# Patient Record
Sex: FEMALE | URBAN - METROPOLITAN AREA
[De-identification: names, ages, dates, MRNs, and addresses within clinical notes are randomized per-mention and may not be internally consistent; named-entity substitution may affect disease eponyms.]

---

## 2017-06-16 ENCOUNTER — INPATIENT (INPATIENT)
Facility: HOSPITAL | Age: 32
LOS: 2 days | Discharge: HOME | DRG: 948 | End: 2017-06-19
Attending: INTERNAL MEDICINE | Admitting: INTERNAL MEDICINE
Payer: MEDICAID

## 2017-06-16 VITALS
WEIGHT: 151.9 LBS | SYSTOLIC BLOOD PRESSURE: 132 MMHG | TEMPERATURE: 99 F | DIASTOLIC BLOOD PRESSURE: 91 MMHG | OXYGEN SATURATION: 99 % | HEART RATE: 84 BPM | HEIGHT: 64 IN | RESPIRATION RATE: 18 BRPM

## 2017-06-16 DIAGNOSIS — I82.90 ACUTE EMBOLISM AND THROMBOSIS OF UNSPECIFIED VEIN: ICD-10-CM

## 2017-06-16 DIAGNOSIS — R52 PAIN, UNSPECIFIED: ICD-10-CM

## 2017-06-16 DIAGNOSIS — G08 INTRACRANIAL AND INTRASPINAL PHLEBITIS AND THROMBOPHLEBITIS: ICD-10-CM

## 2017-06-16 DIAGNOSIS — Z98.891 HISTORY OF UTERINE SCAR FROM PREVIOUS SURGERY: Chronic | ICD-10-CM

## 2017-06-16 DIAGNOSIS — Z98.51 TUBAL LIGATION STATUS: Chronic | ICD-10-CM

## 2017-06-16 DIAGNOSIS — Z29.9 ENCOUNTER FOR PROPHYLACTIC MEASURES, UNSPECIFIED: ICD-10-CM

## 2017-06-16 DIAGNOSIS — R29.90 UNSPECIFIED SYMPTOMS AND SIGNS INVOLVING THE NERVOUS SYSTEM: ICD-10-CM

## 2017-06-16 DIAGNOSIS — I10 ESSENTIAL (PRIMARY) HYPERTENSION: ICD-10-CM

## 2017-06-16 PROBLEM — Z00.00 ENCOUNTER FOR PREVENTIVE HEALTH EXAMINATION: Status: ACTIVE | Noted: 2017-06-16

## 2017-06-16 LAB
ALBUMIN SERPL ELPH-MCNC: 3.6 G/DL — SIGNIFICANT CHANGE UP (ref 3.5–5)
ALP SERPL-CCNC: 35 U/L — LOW (ref 40–120)
ALT FLD-CCNC: 29 U/L DA — SIGNIFICANT CHANGE UP (ref 10–60)
ANION GAP SERPL CALC-SCNC: 6 MMOL/L — SIGNIFICANT CHANGE UP (ref 5–17)
APPEARANCE UR: ABNORMAL
AST SERPL-CCNC: 14 U/L — SIGNIFICANT CHANGE UP (ref 10–40)
BASOPHILS # BLD AUTO: 0.1 K/UL — SIGNIFICANT CHANGE UP (ref 0–0.2)
BASOPHILS NFR BLD AUTO: 1 % — SIGNIFICANT CHANGE UP (ref 0–2)
BILIRUB SERPL-MCNC: 0.4 MG/DL — SIGNIFICANT CHANGE UP (ref 0.2–1.2)
BILIRUB UR-MCNC: NEGATIVE — SIGNIFICANT CHANGE UP
BUN SERPL-MCNC: 20 MG/DL — HIGH (ref 7–18)
CA-I BLD-SCNC: 1.29 MMOL/L — SIGNIFICANT CHANGE UP (ref 1.12–1.3)
CALCIUM SERPL-MCNC: 8.7 MG/DL — SIGNIFICANT CHANGE UP (ref 8.4–10.5)
CHLORIDE SERPL-SCNC: 109 MMOL/L — HIGH (ref 96–108)
CO2 SERPL-SCNC: 27 MMOL/L — SIGNIFICANT CHANGE UP (ref 22–31)
COLOR SPEC: ABNORMAL
CREAT SERPL-MCNC: 0.65 MG/DL — SIGNIFICANT CHANGE UP (ref 0.5–1.3)
CRP SERPL-MCNC: <0.2 MG/DL — SIGNIFICANT CHANGE UP (ref 0–0.4)
DIFF PNL FLD: ABNORMAL
EOSINOPHIL # BLD AUTO: 0.1 K/UL — SIGNIFICANT CHANGE UP (ref 0–0.5)
EOSINOPHIL NFR BLD AUTO: 1.6 % — SIGNIFICANT CHANGE UP (ref 0–6)
ERYTHROCYTE [SEDIMENTATION RATE] IN BLOOD: 4 MM/HR — SIGNIFICANT CHANGE UP (ref 0–15)
GLUCOSE SERPL-MCNC: 103 MG/DL — HIGH (ref 70–99)
GLUCOSE UR QL: NEGATIVE — SIGNIFICANT CHANGE UP
HCG SERPL-ACNC: <1 MIU/ML — SIGNIFICANT CHANGE UP
HCT VFR BLD CALC: 42.1 % — SIGNIFICANT CHANGE UP (ref 34.5–45)
HGB BLD-MCNC: 14.1 G/DL — SIGNIFICANT CHANGE UP (ref 11.5–15.5)
KETONES UR-MCNC: NEGATIVE — SIGNIFICANT CHANGE UP
LACTATE SERPL-SCNC: 0.9 MMOL/L — SIGNIFICANT CHANGE UP (ref 0.7–2)
LEUKOCYTE ESTERASE UR-ACNC: ABNORMAL
LYMPHOCYTES # BLD AUTO: 1.7 K/UL — SIGNIFICANT CHANGE UP (ref 1–3.3)
LYMPHOCYTES # BLD AUTO: 22.4 % — SIGNIFICANT CHANGE UP (ref 13–44)
MCHC RBC-ENTMCNC: 30.8 PG — SIGNIFICANT CHANGE UP (ref 27–34)
MCHC RBC-ENTMCNC: 33.6 GM/DL — SIGNIFICANT CHANGE UP (ref 32–36)
MCV RBC AUTO: 91.6 FL — SIGNIFICANT CHANGE UP (ref 80–100)
MONOCYTES # BLD AUTO: 0.4 K/UL — SIGNIFICANT CHANGE UP (ref 0–0.9)
MONOCYTES NFR BLD AUTO: 5.7 % — SIGNIFICANT CHANGE UP (ref 2–14)
NEUTROPHILS # BLD AUTO: 5.4 K/UL — SIGNIFICANT CHANGE UP (ref 1.8–7.4)
NEUTROPHILS NFR BLD AUTO: 69.4 % — SIGNIFICANT CHANGE UP (ref 43–77)
NITRITE UR-MCNC: NEGATIVE — SIGNIFICANT CHANGE UP
PH UR: 5 — SIGNIFICANT CHANGE UP (ref 5–8)
PLATELET # BLD AUTO: 262 K/UL — SIGNIFICANT CHANGE UP (ref 150–400)
POTASSIUM SERPL-MCNC: 4.2 MMOL/L — SIGNIFICANT CHANGE UP (ref 3.5–5.3)
POTASSIUM SERPL-SCNC: 4.2 MMOL/L — SIGNIFICANT CHANGE UP (ref 3.5–5.3)
PROT SERPL-MCNC: 7 G/DL — SIGNIFICANT CHANGE UP (ref 6–8.3)
PROT UR-MCNC: 30 MG/DL
RBC # BLD: 4.6 M/UL — SIGNIFICANT CHANGE UP (ref 3.8–5.2)
RBC # FLD: 12.6 % — SIGNIFICANT CHANGE UP (ref 10.3–14.5)
SODIUM SERPL-SCNC: 142 MMOL/L — SIGNIFICANT CHANGE UP (ref 135–145)
SP GR SPEC: 1.02 — SIGNIFICANT CHANGE UP (ref 1.01–1.02)
TSH SERPL-MCNC: 3.33 UU/ML — SIGNIFICANT CHANGE UP (ref 0.34–4.82)
UROBILINOGEN FLD QL: NEGATIVE — SIGNIFICANT CHANGE UP
WBC # BLD: 7.8 K/UL — SIGNIFICANT CHANGE UP (ref 3.8–10.5)
WBC # FLD AUTO: 7.8 K/UL — SIGNIFICANT CHANGE UP (ref 3.8–10.5)

## 2017-06-16 PROCEDURE — 70552 MRI BRAIN STEM W/DYE: CPT | Mod: 26

## 2017-06-16 PROCEDURE — 99285 EMERGENCY DEPT VISIT HI MDM: CPT | Mod: 25

## 2017-06-16 RX ORDER — CEFTRIAXONE 500 MG/1
2 INJECTION, POWDER, FOR SOLUTION INTRAMUSCULAR; INTRAVENOUS ONCE
Refills: 0 | Status: COMPLETED | OUTPATIENT
Start: 2017-06-16 | End: 2017-06-16

## 2017-06-16 RX ORDER — DEXTROSE 50 % IN WATER 50 %
1 SYRINGE (ML) INTRAVENOUS ONCE
Refills: 0 | Status: DISCONTINUED | OUTPATIENT
Start: 2017-06-16 | End: 2017-06-19

## 2017-06-16 RX ORDER — MORPHINE SULFATE 50 MG/1
2 CAPSULE, EXTENDED RELEASE ORAL ONCE
Refills: 0 | Status: DISCONTINUED | OUTPATIENT
Start: 2017-06-16 | End: 2017-06-16

## 2017-06-16 RX ORDER — MORPHINE SULFATE 50 MG/1
2 CAPSULE, EXTENDED RELEASE ORAL EVERY 6 HOURS
Refills: 0 | Status: DISCONTINUED | OUTPATIENT
Start: 2017-06-16 | End: 2017-06-19

## 2017-06-16 RX ORDER — INSULIN LISPRO 100/ML
VIAL (ML) SUBCUTANEOUS
Refills: 0 | Status: DISCONTINUED | OUTPATIENT
Start: 2017-06-16 | End: 2017-06-19

## 2017-06-16 RX ORDER — PANTOPRAZOLE SODIUM 20 MG/1
40 TABLET, DELAYED RELEASE ORAL DAILY
Refills: 0 | Status: DISCONTINUED | OUTPATIENT
Start: 2017-06-16 | End: 2017-06-19

## 2017-06-16 RX ORDER — SODIUM CHLORIDE 9 MG/ML
1000 INJECTION INTRAMUSCULAR; INTRAVENOUS; SUBCUTANEOUS ONCE
Refills: 0 | Status: COMPLETED | OUTPATIENT
Start: 2017-06-16 | End: 2017-06-16

## 2017-06-16 RX ORDER — SODIUM CHLORIDE 9 MG/ML
1000 INJECTION, SOLUTION INTRAVENOUS
Refills: 0 | Status: DISCONTINUED | OUTPATIENT
Start: 2017-06-16 | End: 2017-06-19

## 2017-06-16 RX ORDER — GLUCAGON INJECTION, SOLUTION 0.5 MG/.1ML
1 INJECTION, SOLUTION SUBCUTANEOUS ONCE
Refills: 0 | Status: DISCONTINUED | OUTPATIENT
Start: 2017-06-16 | End: 2017-06-19

## 2017-06-16 RX ORDER — CEFTRIAXONE 500 MG/1
INJECTION, POWDER, FOR SOLUTION INTRAMUSCULAR; INTRAVENOUS
Refills: 0 | Status: DISCONTINUED | OUTPATIENT
Start: 2017-06-16 | End: 2017-06-17

## 2017-06-16 RX ORDER — ENOXAPARIN SODIUM 100 MG/ML
40 INJECTION SUBCUTANEOUS DAILY
Refills: 0 | Status: DISCONTINUED | OUTPATIENT
Start: 2017-06-16 | End: 2017-06-19

## 2017-06-16 RX ORDER — LISINOPRIL 2.5 MG/1
10 TABLET ORAL DAILY
Refills: 0 | Status: DISCONTINUED | OUTPATIENT
Start: 2017-06-16 | End: 2017-06-19

## 2017-06-16 RX ORDER — CEFTRIAXONE 500 MG/1
2 INJECTION, POWDER, FOR SOLUTION INTRAMUSCULAR; INTRAVENOUS EVERY 24 HOURS
Refills: 0 | Status: DISCONTINUED | OUTPATIENT
Start: 2017-06-17 | End: 2017-06-17

## 2017-06-16 RX ORDER — DEXTROSE 50 % IN WATER 50 %
25 SYRINGE (ML) INTRAVENOUS ONCE
Refills: 0 | Status: DISCONTINUED | OUTPATIENT
Start: 2017-06-16 | End: 2017-06-19

## 2017-06-16 RX ORDER — DEXTROSE 50 % IN WATER 50 %
12.5 SYRINGE (ML) INTRAVENOUS ONCE
Refills: 0 | Status: DISCONTINUED | OUTPATIENT
Start: 2017-06-16 | End: 2017-06-19

## 2017-06-16 RX ADMIN — Medication 2: at 17:07

## 2017-06-16 RX ADMIN — PANTOPRAZOLE SODIUM 40 MILLIGRAM(S): 20 TABLET, DELAYED RELEASE ORAL at 13:04

## 2017-06-16 RX ADMIN — LISINOPRIL 10 MILLIGRAM(S): 2.5 TABLET ORAL at 17:43

## 2017-06-16 RX ADMIN — Medication 258 MILLIGRAM(S): at 06:53

## 2017-06-16 RX ADMIN — CEFTRIAXONE 100 GRAM(S): 500 INJECTION, POWDER, FOR SOLUTION INTRAMUSCULAR; INTRAVENOUS at 15:47

## 2017-06-16 RX ADMIN — SODIUM CHLORIDE 1000 MILLILITER(S): 9 INJECTION INTRAMUSCULAR; INTRAVENOUS; SUBCUTANEOUS at 06:32

## 2017-06-16 NOTE — ED PROVIDER NOTE - PHYSICAL EXAMINATION
GENERAL: +acute distress, non toxic  HEAD: Atraumatic, normocephalic  EARS: Externally normal, atraumatic  EYES: No jaundice, not injected, no rupture, no foreign bodies, vision grossly intact  MOUTH: Moist mucous membranes, no open lesion, uvula midline without edema, no exudates, no peritonsilar abscess bilaterally.  NECK: Supple, full range of motion, no swelling, no lymphadenopathy  HEART: Regular rate and rhythm, no murmurs, no rubs, no gallops  LUNGS: Clear to auscultation bilaterally without rhonci, rales, or wheezing  ABDOMEN: Soft and non tender in all 4 quadrants, normal bowel sounds, no signs of trauma  BACK/SPINE: Non tender spine in cervical/thoracic/lumbar regions, no stepoffs palpable  EXTREMITIES: No gross deformities  VASCULAR: Pulses palpable in all extremities, no pitting edema, capillary refill <2 secs  SKIN: Grossly intact without rash or open wounds  PSYCH: Alert and oriented x 3  NEURO: sensation intact to light touch, CN3-12 intact.

## 2017-06-16 NOTE — H&P ADULT - ASSESSMENT
Patient is a 31 year-old female with PMH of HTN, sigmoid sinus thrombosis who presents with Patient is a 31 year-old female with PMH of HTN, sigmoid sinus thrombosis who presents with neurological symptoms, generalized pain and is admitted for further evaluation.

## 2017-06-16 NOTE — H&P ADULT - MUSCULOSKELETAL
details… detailed exam ROM intact/no joint swelling/no joint erythema/no joint warmth/no calf tenderness/normal strength

## 2017-06-16 NOTE — ED PROVIDER NOTE - MEDICAL DECISION MAKING DETAILS
32 yo F with reported MS but unclear who or when pt was dx'd with MS that presents with generalized body pain and headache x 2 months requesting immediate treatment for her symptoms. Pt is a very poor historian, cannot tell when she moved to [place] but reportedly from [place] approx 2 month ago. Has been to 3 [facility] for this and has multiple referrals and imaging studies scheduled including MRV/MRA later today of brain. Pt is unsure if she ever had steroids for MS. Her exam is benign other than anxiety/distress and IV track marks. Paperwork she showed shows that she is currently in a shelter. Worry is for MS flare given symptoms reported. Will obtain labs and provide steroids. Looked at [facility] Reference #: [id number] but no narcotic history on file. Pt refusing CT head as she said she knows there is a lot of radiation and refused CT head. Will obtain labs only and provide IVF and steroids. Reassess. 32 yo F with reported MS but unclear who or when pt was dx'd with MS that presents with generalized body pain and headache x 2 months requesting immediate treatment for her symptoms. Pt is a very poor historian, cannot tell when she moved to [place] but reportedly from [place] approx 2 month ago. Has been to 3 [facility] hospitals for this and has multiple referrals and imaging studies scheduled including MRV/MRA later today of brain. Pt is unsure if she ever had steroids for MS. Her exam is benign other than anxiety/distress and IV track marks. Paperwork she showed shows that she is currently in a shelter. Worry is for MS flare given symptoms reported. Will obtain labs and provide steroids. Looked at [facility] Reference #: [id number] but no narcotic history on file. Pt refusing CT head as she said she knows there is a lot of radiation and refused CT head. Will obtain labs only and provide IVF and steroids. Reassess.  symptoms improving with steroids. Will admit to hospital for continued treatment. Pt has no PMD in Counts include 234 beds at the [facility].

## 2017-06-16 NOTE — H&P ADULT - PROBLEM SELECTOR PLAN 2
- may be due to late Lyme disease as patient has diffuse body aches and neurological symptoms; denies rash, known history of tick bite   - follow up Lyme western blot  - continue ceftriaxone 2g daily  - pain management with morphine for severe pain, percocet for moderate pain   - ID: Dr. Abarca

## 2017-06-16 NOTE — ED PROVIDER NOTE - SKIN, MLM
Skin normal color for race, warm, dry and intact. No evidence of rash. Pt has IV marks on bilateral antecubital fossa

## 2017-06-16 NOTE — ED PROVIDER NOTE - PROGRESS NOTE DETAILS
Pt symptoms improved with meds.   Labs so far unremarkable. Spoke to pt regarding admission and she is amenable. Wrote a note to reserve shelter placement due to her need to stay in hospital for at least 5 days.

## 2017-06-16 NOTE — ED PROVIDER NOTE - DIAGNOSIS COUNSELING, MDM
conducted a detailed discussion... I had a detailed discussion with the patient regarding the historical points, exam findings, and any diagnostic results supporting the discharge/admit diagnosis. I had a detailed discussion with the patient regarding the historical points, exam findings, and any diagnostic results supporting the admit diagnosis.

## 2017-06-16 NOTE — H&P ADULT - PROBLEM SELECTOR PLAN 1
- follow up MRI for concern for multiple sclerosis  - may also be due to late Lyme disease  - continue 1g solumedrol daily for 3 more days (total 4 days) for MS flare  - consider neurology consult if there are positive findings on MRI

## 2017-06-16 NOTE — H&P ADULT - HISTORY OF PRESENT ILLNESS
Patient is a 31 year-old female from home with PMH of HTN, sigmoid sinus thrombus who presents with headache, body aches, blurry vision for 2 months. Symptoms do not always occur together and are transient. She was living in [place] until recently when she moved to [place] to establish medical care. She reports that doctors could not find a cause for her symptoms in [place] and she has failed treatment with gabapentin and other medications that she does not know the names of. Patient is a poor historian and is emotionally distraught throughout interview. She reports that she has tingling in scalp, intermittent tingling in hands, occasional chest pain, back pain, and bilateral lower extremity pain that is 8/10 in intensity. She reports that she has self diagnosed herself with multiple sclerosis, and was due for an MRI today (referred by her new neurologist, Dr. Parish). She was experiencing too much discomfort so she came to the ED instead. She also had lab results with her that showed a positive Lyme titer from 6/13/17. Additionally she reports that she is ERIN positive.  She does not think she has ever been treated with steroids for MS flare.         · HPI Objective Statement: 30 yo F with reported Past Medical History of MS, HTN that presents with headache, bodyaches, and blurry vision x 2 months worsening. Tonight woke up 3 hours prior to arrival which headache and couldn't sleep so came to ED for evaluation. Pt is a poor historian, is unsure when she was dx'd with MS. Previously living in [place] but moved to [place] 2 months ago to establish medical care. Since has been to multiple hospital ED including [facility], [facility], and [facility] and has f/u with neurology and scheduled MRV/MRA later today but was unable to stand pain so came to ED. Denies chest pain, SOB, abd pain, N/V/D, fevers, chills, weight loss, night sweats. No recent illnesses. When asked about her MS dx she is unclear who told her she has MS but she reports reading all her symptoms online and "they all fit MS". She does not recall if she ever received steroids for her MS flare and is unsure if she ever had a flare in past. She denies taking any meds for her pain. She denies vaginal bleeding or discharge. She does not think she is pregnant as she had tubal ligation. Patient is a 31 year-old female from home with PMH of HTN, sigmoid sinus thrombosis who presents with headache, body aches, blurry vision for 2 months. Symptoms do not always occur together and are transient. She was living in [place] until recently when she moved to [place] to establish medical care. She reports that doctors could not find a cause for her symptoms in [place] and she has failed treatment with gabapentin and other medications that she does not know the names of. Patient is a poor historian and is emotionally distraught throughout interview. She reports that she has tingling in scalp, intermittent tingling in hands, occasional chest pain, back pain, and bilateral lower extremity pain that is 8/10 in intensity. She reports that she has self diagnosed herself with multiple sclerosis, and was due for an MRI today (referred by her new neurologist, Dr. Andrade). She was experiencing too much discomfort so she came to the ED instead. She also had lab results with her that showed a positive Lyme titer from 6/13/17. Additionally she reports that she is ERIN positive.  She does not think she has ever been treated with steroids for MS flare. She had a sigmoid sinus thrombosis when she was pregnant 5 years ago which was treated by lovenox/heparin while in hospital and coumadin after. She is not currently on blood thinners and does not know why she had the clot but reports she was told it is genetic. She is currently menstruating and symptoms have not been associated with menstruation. She is a former smoker. She drinks alcohol on special occasions and has never used illicit drugs.

## 2017-06-16 NOTE — H&P ADULT - NEUROLOGICAL DETAILS
alert and oriented x 3/responds to pain/responds to verbal commands/sensation intact/deep reflexes intact/cranial nerves intact/no spontaneous movement/normal strength

## 2017-06-16 NOTE — ED PROVIDER NOTE - OBJECTIVE STATEMENT
32 yo F with reported Past Medical History of MS, HTN that presents with headache, bodyaches, and blurry vision x 2 months worsening. Tonight woke up 3 hours prior to arrival which headache and couldn't sleep so came to ED for evaluation. Pt is a poor historian, is unsure when she was dx'd with MS. Previously living in [place] but moved to [place] 2 months ago to establish medical care. Since has been to multiple hospital ED including [facility], [facility], and [facility] and has f/u with neurology and scheduled MRV/MRA later today but was unable to stand pain so came to ED. Denies chest pain, SOB, abd pain, N/V/D, fevers, chills, weight loss, night sweats. No recent illnesses. When asked about her MS dx she is unclear who told her she has MS but she reports reading all her symptoms online and "they all fit MS". She does not recall if she ever received steroids for her MS flare and is unsure if she ever had a flare in past. She denies taking any meds for her pain. She denies vaginal bleeding or discharge. She does not think she is pregnant as she had tubal ligation.

## 2017-06-16 NOTE — H&P ADULT - NEUROLOGICAL COMMENTS
has intermittent transient weakness bilaterally; parasthesias in both hands; "tingling" sensation on scalp; intermittent transient blurry vision bilaterally

## 2017-06-16 NOTE — ED PROVIDER NOTE - CARE PLAN
Principal Discharge DX:	Generalized pain Principal Discharge DX:	Generalized pain  Secondary Diagnosis:	Multiple sclerosis exacerbation

## 2017-06-17 DIAGNOSIS — A69.20 LYME DISEASE, UNSPECIFIED: ICD-10-CM

## 2017-06-17 DIAGNOSIS — D72.829 ELEVATED WHITE BLOOD CELL COUNT, UNSPECIFIED: ICD-10-CM

## 2017-06-17 LAB
24R-OH-CALCIDIOL SERPL-MCNC: 24.4 NG/ML — LOW (ref 30–100)
ANION GAP SERPL CALC-SCNC: 9 MMOL/L — SIGNIFICANT CHANGE UP (ref 5–17)
BUN SERPL-MCNC: 12 MG/DL — SIGNIFICANT CHANGE UP (ref 7–18)
CALCIUM SERPL-MCNC: 9.6 MG/DL — SIGNIFICANT CHANGE UP (ref 8.4–10.5)
CHLORIDE SERPL-SCNC: 107 MMOL/L — SIGNIFICANT CHANGE UP (ref 96–108)
CO2 SERPL-SCNC: 26 MMOL/L — SIGNIFICANT CHANGE UP (ref 22–31)
CREAT SERPL-MCNC: 0.63 MG/DL — SIGNIFICANT CHANGE UP (ref 0.5–1.3)
CULTURE RESULTS: SIGNIFICANT CHANGE UP
GLUCOSE SERPL-MCNC: 116 MG/DL — HIGH (ref 70–99)
HBA1C BLD-MCNC: 5.4 % — SIGNIFICANT CHANGE UP (ref 4–5.6)
HCT VFR BLD CALC: 43.4 % — SIGNIFICANT CHANGE UP (ref 34.5–45)
HGB BLD-MCNC: 15.2 G/DL — SIGNIFICANT CHANGE UP (ref 11.5–15.5)
LYME C6 AB IGG/IGM EIA REFLEX WESTERN BL: SIGNIFICANT CHANGE UP
LYMPHOCYTES # BLD AUTO: 8 % — LOW (ref 13–44)
MAGNESIUM SERPL-MCNC: 2.1 MG/DL — SIGNIFICANT CHANGE UP (ref 1.6–2.6)
MCHC RBC-ENTMCNC: 32.1 PG — SIGNIFICANT CHANGE UP (ref 27–34)
MCHC RBC-ENTMCNC: 35.1 GM/DL — SIGNIFICANT CHANGE UP (ref 32–36)
MCV RBC AUTO: 91.5 FL — SIGNIFICANT CHANGE UP (ref 80–100)
MONOCYTES NFR BLD AUTO: 3 % — SIGNIFICANT CHANGE UP (ref 2–14)
NEUTROPHILS NFR BLD AUTO: 89 % — HIGH (ref 43–77)
PHOSPHATE SERPL-MCNC: 3.2 MG/DL — SIGNIFICANT CHANGE UP (ref 2.5–4.5)
PLATELET # BLD AUTO: 299 K/UL — SIGNIFICANT CHANGE UP (ref 150–400)
POTASSIUM SERPL-MCNC: 3.8 MMOL/L — SIGNIFICANT CHANGE UP (ref 3.5–5.3)
POTASSIUM SERPL-SCNC: 3.8 MMOL/L — SIGNIFICANT CHANGE UP (ref 3.5–5.3)
RBC # BLD: 4.74 M/UL — SIGNIFICANT CHANGE UP (ref 3.8–5.2)
RBC # FLD: 12.6 % — SIGNIFICANT CHANGE UP (ref 10.3–14.5)
SODIUM SERPL-SCNC: 142 MMOL/L — SIGNIFICANT CHANGE UP (ref 135–145)
SPECIMEN SOURCE: SIGNIFICANT CHANGE UP
VIT B12 SERPL-MCNC: 540 PG/ML — SIGNIFICANT CHANGE UP (ref 243–894)
WBC # BLD: 23.1 K/UL — HIGH (ref 3.8–10.5)
WBC # FLD AUTO: 23.1 K/UL — HIGH (ref 3.8–10.5)

## 2017-06-17 RX ORDER — BENZOCAINE AND MENTHOL 5; 1 G/100ML; G/100ML
1 LIQUID ORAL
Refills: 0 | Status: DISCONTINUED | OUTPATIENT
Start: 2017-06-17 | End: 2017-06-19

## 2017-06-17 RX ORDER — ACETAMINOPHEN 500 MG
650 TABLET ORAL EVERY 6 HOURS
Refills: 0 | Status: DISCONTINUED | OUTPATIENT
Start: 2017-06-17 | End: 2017-06-19

## 2017-06-17 RX ADMIN — Medication: at 21:30

## 2017-06-17 RX ADMIN — PANTOPRAZOLE SODIUM 40 MILLIGRAM(S): 20 TABLET, DELAYED RELEASE ORAL at 11:27

## 2017-06-17 RX ADMIN — Medication 650 MILLIGRAM(S): at 13:30

## 2017-06-17 RX ADMIN — ENOXAPARIN SODIUM 40 MILLIGRAM(S): 100 INJECTION SUBCUTANEOUS at 11:27

## 2017-06-17 RX ADMIN — Medication 650 MILLIGRAM(S): at 06:13

## 2017-06-17 RX ADMIN — LISINOPRIL 10 MILLIGRAM(S): 2.5 TABLET ORAL at 06:14

## 2017-06-17 RX ADMIN — Medication 650 MILLIGRAM(S): at 13:04

## 2017-06-17 RX ADMIN — Medication 1: at 17:46

## 2017-06-17 RX ADMIN — Medication 650 MILLIGRAM(S): at 07:05

## 2017-06-17 RX ADMIN — Medication 258 MILLIGRAM(S): at 06:53

## 2017-06-17 NOTE — CONSULT NOTE ADULT - SUBJECTIVE AND OBJECTIVE BOX
HPI:  Patient is a 31 year-old female from home with PMH of HTN, sigmoid sinus thrombosis who presents with headache, body aches, blurry vision for 2 months. Symptoms do not always occur together and are transient. She was living in [place] until recently when she moved to [place] to establish medical care. She reports that doctors could not find a cause for her symptoms in [place] and she has failed treatment with gabapentin and other medications that she does not know the names of. Patient is a poor historian and is emotionally distraught throughout interview. She reports that she has tingling in scalp, intermittent tingling in hands, occasional chest pain, back pain, and bilateral lower extremity pain that is 8/10 in intensity. She reports that she has self diagnosed herself with multiple sclerosis, and was due for an MRI today (referred by her new neurologist, Dr. Andrade). She was experiencing too much discomfort so she came to the ED instead. She also had lab results with her that showed a positive Lyme titer from 17. Additionally she reports that she is ERIN positive.  She does not think she has ever been treated with steroids for MS flare. She had a sigmoid sinus thrombosis when she was pregnant 5 years ago which was treated by lovenox/heparin while in hospital and coumadin after. She is not currently on blood thinners and does not know why she had the clot but reports she was told it is genetic. She is currently menstruating and symptoms have not been associated with menstruation. She is a former smoker. She drinks alcohol on special occasions and has never used illicit drugs. (2017 13:05)    18 - 31 year old female sitting up in bed, NAD.  Pt complaining of numbness and tingling in both legs.  It initially started at the feet but now has radiated to knees.   Nunmness and tingling is intermittent. Pt also complaining of intermittent  paraesthesia of bilateral hands.  + tingling of scalp which is constant.  + 20 weight loss - unintentional.  Pt is anxious and has insomnia.  Pt had recent blood test in which the western blot came out positive for lyme disease. MRI of brain was done here which is negative. No recent travels or trauma or falls.  No nausea or vomiting.  No bowel or bladder incontinence.  + blurry vision which is intermittent.  + headache - bilateral temporal area.        PAST MEDICAL & SURGICAL HISTORY:  Hypertension  Lyme disease  Multiple sclerosis  History of tubal ligation  H/O:       No Known Allergies      Meds:  methylPREDNISolone sodium succinate IVPB 1000milliGRAM(s) IV Intermittent daily  insulin lispro (HumaLOG) corrective regimen sliding scale  SubCutaneous three times a day before meals  dextrose 5%. 1000milliLiter(s) IV Continuous <Continuous>  dextrose Gel 1Dose(s) Oral once PRN  dextrose 50% Injectable 12.5Gram(s) IV Push once  dextrose 50% Injectable 25Gram(s) IV Push once  dextrose 50% Injectable 25Gram(s) IV Push once  glucagon  Injectable 1milliGRAM(s) IntraMuscular once PRN  pantoprazole  Injectable 40milliGRAM(s) IV Push daily  lisinopril 10milliGRAM(s) Oral daily  enoxaparin Injectable 40milliGRAM(s) SubCutaneous daily  morphine  - Injectable 2milliGRAM(s) IV Push every 6 hours PRN  oxyCODONE  5 mG/acetaminophen 325 mG 1Tablet(s) Oral every 4 hours PRN  benzocaine 15 mG/menthol 3.6 mG Lozenge 1Lozenge Oral five times a day PRN  acetaminophen   Tablet. 650milliGRAM(s) Oral every 6 hours PRN      SOCIAL HISTORY:  Smoker:  former smoker  ETOH use:  Social  Ilicit Drug use:  No  Occupation:  Assisted device use: no  Live with: spouse    FAMILY HISTORY:  grandmother - mother's side - htn and dm      VITALS:  Vital Signs Last 24 Hrs  T(C): 36.7, Max: 36.9 ( @ 14:36)  T(F): 98, Max: 98.5 ( @ 20:51)  HR: 85 (83 - 105)  BP: 114/73 (106/69 - 131/83)  BP(mean): --  RR: 16 (14 - 16)  SpO2: 100% (98% - 100%)    LABS/DIAGNOSTIC TESTS:                          15.2   23.1  )-----------( 299      ( 2017 07:35 )             43.4     WBC Count: 23.1 K/uL ( @ 07:35)  WBC Count: 7.8 K/uL ( @ 06:31)          142  |  107  |  12  ----------------------------<  116<H>  3.8   |  26  |  0.63    Ca    9.6      2017 07:35  Phos  3.2       Mg     2.1         TPro  7.0  /  Alb  3.6  /  TBili  0.4  /  DBili  x   /  AST  14  /  ALT  29  /  AlkPhos  35<L>        Urinalysis Basic - ( 2017 08:39 )    Color: Red / Appearance: very cloudy / S.025 / pH: x  Gluc: x / Ketone: Negative  / Bili: Negative / Urobili: Negative   Blood: x / Protein: 30 mg/dL / Nitrite: Negative   Leuk Esterase: Trace / RBC: >50 /HPF / WBC 3-5 /HPF   Sq Epi: x / Non Sq Epi: Few / Bacteria: Moderate /HPF        LIVER FUNCTIONS - ( 2017 06:31 )  Alb: 3.6 g/dL / Pro: 7.0 g/dL / ALK PHOS: 35 U/L / ALT: 29 U/L DA / AST: 14 U/L / GGT: x                 LACTATE:    ABG -     CULTURES:   .Urine Clean Catch (Midstream)  16 @ 13:17   <10,000 CFU/ml Normal Urogenital manjeet present  --  --          RADIOLOGY:            EXAM:  BRAIN MRI WITH CONTRAST                            PROCEDURE DATE:  2017        INTERPRETATION:  MRI brain with and without contrast    History transient paresthesias and extremity weakness and blurred vision    Contrast Gadavist 7 cc;0.5 cc discarded    There is no mass effect, cortical edema or hydrocephalus. Cortical volume   and white matter signal are preserved. There is no evidence of acute   infarct or previous parenchymal hemorrhage. The orbital and sellar   contents and cerebellar tonsils are within normal limits. There is normal   physiologic enhancement.    IMPRESSION:    Normal brain HPI:  Patient is a 31 year-old female from home with PMH of HTN, sigmoid sinus thrombosis who presents with headache, body aches, blurry vision for 2 months. Symptoms do not always occur together and are transient. She was living in [place] until recently when she moved to [place] to establish medical care. She reports that doctors could not find a cause for her symptoms in [place] and she has failed treatment with gabapentin and other medications that she does not know the names of. Patient is a poor historian and is emotionally distraught throughout interview. She reports that she has tingling in scalp, intermittent tingling in hands, occasional chest pain, back pain, and bilateral lower extremity pain that is 8/10 in intensity. She reports that she has self diagnosed herself with multiple sclerosis, and was due for an MRI today (referred by her new neurologist, Dr. Andrade). She was experiencing too much discomfort so she came to the ED instead. She also had lab results with her that showed a positive Lyme titer from 17. Additionally she reports that she is ERIN positive.  She does not think she has ever been treated with steroids for MS flare. She had a sigmoid sinus thrombosis when she was pregnant 5 years ago which was treated by lovenox/heparin while in hospital and coumadin after. She is not currently on blood thinners and does not know why she had the clot but reports she was told it is genetic. She is currently menstruating and symptoms have not been associated with menstruation. She is a former smoker. She drinks alcohol on special occasions and has never used illicit drugs.     18 - 31 year old female sitting up in bed, NAD.  Pt complaining of numbness and tingling in both legs.  It initially started at the feet but now has radiated to knees.   Nunmness and tingling is intermittent. Pt also complaining of intermittent  paraesthesia of bilateral hands.  + tingling of scalp which is constant.  + 20 weight loss - unintentional.  Pt is anxious and has insomnia. MRI of brain was done here which is negative. No recent travels or trauma or falls.  No nausea or vomiting.  No bowel or bladder incontinence.  + blurry vision which is intermittent.  + headache - bilateral temporal area.   pt has has no clinical evidence for lyme such as a tick bite or rash similar to erythema migrans ,she has no symptoms specific to lyme disease or fitting the appropiate time line of lyme.  she had two different tests for lyme from her previous doctor which I have reviewed ,both were screening tests and not westernblots, one had a neg titer of 0.63 and one had a marginally positive titer of 1.11 upper limit of normal is 1.09 , here her lyme screen is negative hence a reflex westernblot will not be done.       PAST MEDICAL & SURGICAL HISTORY:  Hypertension  Lyme disease  Multiple sclerosis  History of tubal ligation  H/O:       No Known Allergies      Meds:  methylPREDNISolone sodium succinate IVPB 1000milliGRAM(s) IV Intermittent daily  insulin lispro (HumaLOG) corrective regimen sliding scale  SubCutaneous three times a day before meals  dextrose 5%. 1000milliLiter(s) IV Continuous <Continuous>  dextrose Gel 1Dose(s) Oral once PRN  dextrose 50% Injectable 12.5Gram(s) IV Push once  dextrose 50% Injectable 25Gram(s) IV Push once  dextrose 50% Injectable 25Gram(s) IV Push once  glucagon  Injectable 1milliGRAM(s) IntraMuscular once PRN  pantoprazole  Injectable 40milliGRAM(s) IV Push daily  lisinopril 10milliGRAM(s) Oral daily  enoxaparin Injectable 40milliGRAM(s) SubCutaneous daily  morphine  - Injectable 2milliGRAM(s) IV Push every 6 hours PRN  oxyCODONE  5 mG/acetaminophen 325 mG 1Tablet(s) Oral every 4 hours PRN  benzocaine 15 mG/menthol 3.6 mG Lozenge 1Lozenge Oral five times a day PRN  acetaminophen   Tablet. 650milliGRAM(s) Oral every 6 hours PRN      SOCIAL HISTORY:  Smoker:  former smoker  ETOH use:  Social  Ilicit Drug use:  No  Occupation:  Assisted device use: no  Live with: spouse    FAMILY HISTORY:  grandmother - mother's side - htn and dm      VITALS:  Vital Signs Last 24 Hrs  T(C): 36.7, Max: 36.9 (06-16 @ 14:36)  T(F): 98, Max: 98.5 (06-16 @ 20:51)  HR: 85 (83 - 105)  BP: 114/73 (106/69 - 131/83)  BP(mean): --  RR: 16 (14 - 16)  SpO2: 100% (98% - 100%)    LABS/DIAGNOSTIC TESTS:                          15.2   23.1  )-----------( 299      ( 2017 07:35 )             43.4     WBC Count: 23.1 K/uL ( @ 07:35)  WBC Count: 7.8 K/uL ( @ 06:31)          142  |  107  |  12  ----------------------------<  116<H>  3.8   |  26  |  0.63    Ca    9.6      2017 07:35  Phos  3.2       Mg     2.1         TPro  7.0  /  Alb  3.6  /  TBili  0.4  /  DBili  x   /  AST  14  /  ALT  29  /  AlkPhos  35<L>        Urinalysis Basic - ( 2017 08:39 )    Color: Red / Appearance: very cloudy / S.025 / pH: x  Gluc: x / Ketone: Negative  / Bili: Negative / Urobili: Negative   Blood: x / Protein: 30 mg/dL / Nitrite: Negative   Leuk Esterase: Trace / RBC: >50 /HPF / WBC 3-5 /HPF   Sq Epi: x / Non Sq Epi: Few / Bacteria: Moderate /HPF        LIVER FUNCTIONS - ( 2017 06:31 )  Alb: 3.6 g/dL / Pro: 7.0 g/dL / ALK PHOS: 35 U/L / ALT: 29 U/L DA / AST: 14 U/L / GGT: x                 LACTATE:    ABG -     CULTURES:   .Urine Clean Catch (Midstream)   @ 13:17   <10,000 CFU/ml Normal Urogenital manjeet present  --  --          RADIOLOGY:            EXAM:  BRAIN MRI WITH CONTRAST                            PROCEDURE DATE:  2017        INTERPRETATION:  MRI brain with and without contrast    History transient paresthesias and extremity weakness and blurred vision    Contrast Gadavist 7 cc;0.5 cc discarded    There is no mass effect, cortical edema or hydrocephalus. Cortical volume   and white matter signal are preserved. There is no evidence of acute   infarct or previous parenchymal hemorrhage. The orbital and sellar   contents and cerebellar tonsils are within normal limits. There is normal   physiologic enhancement.    IMPRESSION:    Normal brain

## 2017-06-17 NOTE — CONSULT NOTE ADULT - ASSESSMENT
leukocytosis - secondary to pulse dose steroids  no evidence of lyme disease with a negative TENA test hence no further work up for lyme disease as inpt is required,   rocephin was also dced as no medical indication to give it.  pt seems fixated on having some neurological illness despite all her tests indicating otherwise and several doctors from different states not finding anything wrong with her.  would be wise to consider psychiatric illness - she may benefit from a psychiatry consult.  reconsult prn.

## 2017-06-17 NOTE — PROGRESS NOTE ADULT - SUBJECTIVE AND OBJECTIVE BOX
Patient is a 31y old  Female who presents with a chief complaint of tingling on scalp, bilateral hands; diffuse pain (2017 13:05)  Patient seen and examined.    INTERVAL HPI/OVERNIGHT EVENTS:  T(C): 36.6, Max: 36.7 ( @ 05:15)  HR: 76 (76 - 97)  BP: 118/80 (110/75 - 118/80)  RR: 16 (16 - 16)  SpO2: 95% (95% - 100%)  Wt(kg): --  I&O's Summary      PAST MEDICAL & SURGICAL HISTORY:  Hypertension  Lyme disease  Multiple sclerosis  History of tubal ligation  H/O:     PHYSICAL EXAM:  GENERAL: NAD, well-groomed, well-developed  HEAD:  Atraumatic, Normocephalic  EYES: EOMI, PERRLA, conjunctiva and sclera clear  ENMT: No tonsillar erythema, exudates, or enlargement; Moist mucous membranes, Good dentition, No lesions  NECK: Supple, No JVD, Normal thyroid  NERVOUS SYSTEM:  Alert & Oriented X3, Good concentration; Motor Strength 5/5 B/L upper and lower extremities; DTRs 2+ intact and symmetric  CHEST/LUNG: Clear to percussion bilaterally; No rales, rhonchi, wheezing, or rubs  HEART: Regular rate and rhythm; No murmurs, rubs, or gallops  ABDOMEN: Soft, Nontender, Nondistended; Bowel sounds present  EXTREMITIES:  2+ Peripheral Pulses, No clubbing, cyanosis, or edema  LYMPH: No lymphadenopathy noted  SKIN: No rashes or lesions    LABS:                        15.2   23.1  )-----------( 299      ( 2017 07:35 )             43.4     -    142  |  107  |  12  ----------------------------<  116<H>  3.8   |  26  |  0.63    Ca    9.6      2017 07:35  Phos  3.2     -  Mg     2.1     -    TPro  7.0  /  Alb  3.6  /  TBili  0.4  /  DBili  x   /  AST  14  /  ALT  29  /  AlkPhos  35<L>  06-16      Urinalysis Basic - ( 2017 08:39 )    Color: Red / Appearance: very cloudy / S.025 / pH: x  Gluc: x / Ketone: Negative  / Bili: Negative / Urobili: Negative   Blood: x / Protein: 30 mg/dL / Nitrite: Negative   Leuk Esterase: Trace / RBC: >50 /HPF / WBC 3-5 /HPF   Sq Epi: x / Non Sq Epi: Few / Bacteria: Moderate /HPF      CAPILLARY BLOOD GLUCOSE  133 (2017 21:01)  166 (2017 16:19)  129 (2017 11:40)  128 (2017 07:36)        Urinalysis Basic - ( 2017 08:39 )    Color: Red / Appearance: very cloudy / S.025 / pH: x  Gluc: x / Ketone: Negative  / Bili: Negative / Urobili: Negative   Blood: x / Protein: 30 mg/dL / Nitrite: Negative   Leuk Esterase: Trace / RBC: >50 /HPF / WBC 3-5 /HPF   Sq Epi: x / Non Sq Epi: Few / Bacteria: Moderate /HPF        MEDICATIONS  (STANDING):  methylPREDNISolone sodium succinate IVPB 1000milliGRAM(s) IV Intermittent daily  insulin lispro (HumaLOG) corrective regimen sliding scale  SubCutaneous three times a day before meals  dextrose 5%. 1000milliLiter(s) IV Continuous <Continuous>  dextrose 50% Injectable 12.5Gram(s) IV Push once  dextrose 50% Injectable 25Gram(s) IV Push once  dextrose 50% Injectable 25Gram(s) IV Push once  pantoprazole  Injectable 40milliGRAM(s) IV Push daily  lisinopril 10milliGRAM(s) Oral daily  enoxaparin Injectable 40milliGRAM(s) SubCutaneous daily    MEDICATIONS  (PRN):  dextrose Gel 1Dose(s) Oral once PRN Blood Glucose LESS THAN 70 milliGRAM(s)/deciLiter  glucagon  Injectable 1milliGRAM(s) IntraMuscular once PRN Glucose <70 milliGRAM(s)/deciLiter  morphine  - Injectable 2milliGRAM(s) IV Push every 6 hours PRN Severe Pain (7 - 10)  oxyCODONE  5 mG/acetaminophen 325 mG 1Tablet(s) Oral every 4 hours PRN Moderate Pain (4 - 6)  benzocaine 15 mG/menthol 3.6 mG Lozenge 1Lozenge Oral five times a day PRN Sore Throat  acetaminophen   Tablet. 650milliGRAM(s) Oral every 6 hours PRN Mild Pain (1 - 3)      Care Discussed with Consultants/Other Providers [ ] YES  [ ] NO

## 2017-06-18 LAB
ANION GAP SERPL CALC-SCNC: 8 MMOL/L — SIGNIFICANT CHANGE UP (ref 5–17)
BASOPHILS # BLD AUTO: 0 K/UL — SIGNIFICANT CHANGE UP (ref 0–0.2)
BASOPHILS NFR BLD AUTO: 0.2 % — SIGNIFICANT CHANGE UP (ref 0–2)
BUN SERPL-MCNC: 19 MG/DL — HIGH (ref 7–18)
CALCIUM SERPL-MCNC: 9.1 MG/DL — SIGNIFICANT CHANGE UP (ref 8.4–10.5)
CHLORIDE SERPL-SCNC: 109 MMOL/L — HIGH (ref 96–108)
CO2 SERPL-SCNC: 25 MMOL/L — SIGNIFICANT CHANGE UP (ref 22–31)
CREAT SERPL-MCNC: 0.56 MG/DL — SIGNIFICANT CHANGE UP (ref 0.5–1.3)
EOSINOPHIL # BLD AUTO: 0 K/UL — SIGNIFICANT CHANGE UP (ref 0–0.5)
EOSINOPHIL NFR BLD AUTO: 0 % — SIGNIFICANT CHANGE UP (ref 0–6)
GLUCOSE SERPL-MCNC: 95 MG/DL — SIGNIFICANT CHANGE UP (ref 70–99)
HCT VFR BLD CALC: 40.7 % — SIGNIFICANT CHANGE UP (ref 34.5–45)
HGB BLD-MCNC: 14 G/DL — SIGNIFICANT CHANGE UP (ref 11.5–15.5)
LYMPHOCYTES # BLD AUTO: 1.8 K/UL — SIGNIFICANT CHANGE UP (ref 1–3.3)
LYMPHOCYTES # BLD AUTO: 7.9 % — LOW (ref 13–44)
MAGNESIUM SERPL-MCNC: 2.4 MG/DL — SIGNIFICANT CHANGE UP (ref 1.6–2.6)
MCHC RBC-ENTMCNC: 32 PG — SIGNIFICANT CHANGE UP (ref 27–34)
MCHC RBC-ENTMCNC: 34.5 GM/DL — SIGNIFICANT CHANGE UP (ref 32–36)
MCV RBC AUTO: 92.6 FL — SIGNIFICANT CHANGE UP (ref 80–100)
MONOCYTES # BLD AUTO: 0.9 K/UL — SIGNIFICANT CHANGE UP (ref 0–0.9)
MONOCYTES NFR BLD AUTO: 3.9 % — SIGNIFICANT CHANGE UP (ref 2–14)
NEUTROPHILS # BLD AUTO: 20.7 K/UL — HIGH (ref 1.8–7.4)
NEUTROPHILS NFR BLD AUTO: 88 % — HIGH (ref 43–77)
PHOSPHATE SERPL-MCNC: 2.7 MG/DL — SIGNIFICANT CHANGE UP (ref 2.5–4.5)
PLATELET # BLD AUTO: 285 K/UL — SIGNIFICANT CHANGE UP (ref 150–400)
POTASSIUM SERPL-MCNC: 4 MMOL/L — SIGNIFICANT CHANGE UP (ref 3.5–5.3)
POTASSIUM SERPL-SCNC: 4 MMOL/L — SIGNIFICANT CHANGE UP (ref 3.5–5.3)
RBC # BLD: 4.39 M/UL — SIGNIFICANT CHANGE UP (ref 3.8–5.2)
RBC # FLD: 12.6 % — SIGNIFICANT CHANGE UP (ref 10.3–14.5)
SODIUM SERPL-SCNC: 142 MMOL/L — SIGNIFICANT CHANGE UP (ref 135–145)
WBC # BLD: 23.5 K/UL — HIGH (ref 3.8–10.5)
WBC # FLD AUTO: 23.5 K/UL — HIGH (ref 3.8–10.5)

## 2017-06-18 RX ORDER — IBUPROFEN 200 MG
400 TABLET ORAL ONCE
Refills: 0 | Status: COMPLETED | OUTPATIENT
Start: 2017-06-18 | End: 2017-06-18

## 2017-06-18 RX ORDER — SENNA PLUS 8.6 MG/1
2 TABLET ORAL AT BEDTIME
Refills: 0 | Status: DISCONTINUED | OUTPATIENT
Start: 2017-06-18 | End: 2017-06-19

## 2017-06-18 RX ORDER — DOCUSATE SODIUM 100 MG
100 CAPSULE ORAL DAILY
Refills: 0 | Status: DISCONTINUED | OUTPATIENT
Start: 2017-06-18 | End: 2017-06-19

## 2017-06-18 RX ADMIN — ENOXAPARIN SODIUM 40 MILLIGRAM(S): 100 INJECTION SUBCUTANEOUS at 12:05

## 2017-06-18 RX ADMIN — Medication 400 MILLIGRAM(S): at 20:47

## 2017-06-18 RX ADMIN — MORPHINE SULFATE 2 MILLIGRAM(S): 50 CAPSULE, EXTENDED RELEASE ORAL at 21:19

## 2017-06-18 RX ADMIN — Medication 400 MILLIGRAM(S): at 12:30

## 2017-06-18 RX ADMIN — PANTOPRAZOLE SODIUM 40 MILLIGRAM(S): 20 TABLET, DELAYED RELEASE ORAL at 12:05

## 2017-06-18 RX ADMIN — LISINOPRIL 10 MILLIGRAM(S): 2.5 TABLET ORAL at 06:13

## 2017-06-18 RX ADMIN — MORPHINE SULFATE 2 MILLIGRAM(S): 50 CAPSULE, EXTENDED RELEASE ORAL at 21:35

## 2017-06-18 RX ADMIN — SENNA PLUS 2 TABLET(S): 8.6 TABLET ORAL at 21:18

## 2017-06-18 RX ADMIN — Medication 400 MILLIGRAM(S): at 12:04

## 2017-06-18 RX ADMIN — Medication 400 MILLIGRAM(S): at 19:53

## 2017-06-18 NOTE — PROGRESS NOTE ADULT - SUBJECTIVE AND OBJECTIVE BOX
Patient is a 31y old  Female who presents with a chief complaint of tingling on scalp, bilateral hands; diffuse pain (2017 13:05)  Patient seen and examined. c/o burning pain all over, wants spine imaged now,    Vital Signs Last 24 Hrs  T(C): 36.6, Max: 37 (-18 @ 14:43)  T(F): 97.9, Max: 98.6 ( @ 14:43)  HR: 65 (62 - 72)  BP: 117/74 (115/74 - 130/81)  BP(mean): --  RR: 16 (16 - 16)  SpO2: 95% (95% - 100%)      PAST MEDICAL & SURGICAL HISTORY:  Hypertension  Lyme disease  Multiple sclerosis  History of tubal ligation  H/O:     PHYSICAL EXAM:  GENERAL: NAD, well-groomed, well-developed  HEAD:  Atraumatic, Normocephalic  EYES: EOMI, PERRLA, conjunctiva and sclera clear  ENMT: No tonsillar erythema, exudates, or enlargement; Moist mucous membranes, Good dentition, No lesions  NECK: Supple, No JVD, Normal thyroid  NERVOUS SYSTEM:  Alert & Oriented X3, Good concentration; Motor Strength 5/5 B/L upper and lower extremities; DTRs 2+ intact and symmetric  CHEST/LUNG: Clear to percussion bilaterally; No rales, rhonchi, wheezing, or rubs  HEART: Regular rate and rhythm; No murmurs, rubs, or gallops  ABDOMEN: Soft, Nontender, Nondistended; Bowel sounds present  EXTREMITIES:  2+ Peripheral Pulses, No clubbing, cyanosis, or edema  LYMPH: No lymphadenopathy noted  SKIN: No rashes or lesions    LABS:                                   14.0   23.5  )-----------( 285      ( 2017 07:09 )             40.7       06-18    142  |  109<H>  |  19<H>  ----------------------------<  95  4.0   |  25  |  0.56    Ca    9.1      2017 07:09  Phos  2.7     06-18  Mg     2.4     06-18          MEDICATIONS  (STANDING):  methylPREDNISolone sodium succinate IVPB 1000milliGRAM(s) IV Intermittent daily  insulin lispro (HumaLOG) corrective regimen sliding scale  SubCutaneous three times a day before meals  dextrose 5%. 1000milliLiter(s) IV Continuous <Continuous>  dextrose 50% Injectable 12.5Gram(s) IV Push once  dextrose 50% Injectable 25Gram(s) IV Push once  dextrose 50% Injectable 25Gram(s) IV Push once  pantoprazole  Injectable 40milliGRAM(s) IV Push daily  lisinopril 10milliGRAM(s) Oral daily  enoxaparin Injectable 40milliGRAM(s) SubCutaneous daily    MEDICATIONS  (PRN):  dextrose Gel 1Dose(s) Oral once PRN Blood Glucose LESS THAN 70 milliGRAM(s)/deciLiter  glucagon  Injectable 1milliGRAM(s) IntraMuscular once PRN Glucose <70 milliGRAM(s)/deciLiter  morphine  - Injectable 2milliGRAM(s) IV Push every 6 hours PRN Severe Pain (7 - 10)  oxyCODONE  5 mG/acetaminophen 325 mG 1Tablet(s) Oral every 4 hours PRN Moderate Pain (4 - 6)  benzocaine 15 mG/menthol 3.6 mG Lozenge 1Lozenge Oral five times a day PRN Sore Throat  acetaminophen   Tablet. 650milliGRAM(s) Oral every 6 hours PRN Mild Pain (1 - 3)      Care Discussed with Consultants/Other Providers [ ] YES  [ ] NO

## 2017-06-19 VITALS
TEMPERATURE: 98 F | DIASTOLIC BLOOD PRESSURE: 68 MMHG | RESPIRATION RATE: 16 BRPM | OXYGEN SATURATION: 100 % | HEART RATE: 68 BPM | SYSTOLIC BLOOD PRESSURE: 110 MMHG

## 2017-06-19 DIAGNOSIS — F43.22 ADJUSTMENT DISORDER WITH ANXIETY: ICD-10-CM

## 2017-06-19 LAB
ANION GAP SERPL CALC-SCNC: 6 MMOL/L — SIGNIFICANT CHANGE UP (ref 5–17)
BASOPHILS # BLD AUTO: 0.1 K/UL — SIGNIFICANT CHANGE UP (ref 0–0.2)
BASOPHILS NFR BLD AUTO: 0.6 % — SIGNIFICANT CHANGE UP (ref 0–2)
BUN SERPL-MCNC: 23 MG/DL — HIGH (ref 7–18)
CALCIUM SERPL-MCNC: 8.6 MG/DL — SIGNIFICANT CHANGE UP (ref 8.4–10.5)
CHLORIDE SERPL-SCNC: 108 MMOL/L — SIGNIFICANT CHANGE UP (ref 96–108)
CO2 SERPL-SCNC: 26 MMOL/L — SIGNIFICANT CHANGE UP (ref 22–31)
CREAT SERPL-MCNC: 0.61 MG/DL — SIGNIFICANT CHANGE UP (ref 0.5–1.3)
EOSINOPHIL # BLD AUTO: 0.1 K/UL — SIGNIFICANT CHANGE UP (ref 0–0.5)
EOSINOPHIL NFR BLD AUTO: 0.9 % — SIGNIFICANT CHANGE UP (ref 0–6)
GLUCOSE SERPL-MCNC: 84 MG/DL — SIGNIFICANT CHANGE UP (ref 70–99)
HCT VFR BLD CALC: 38.6 % — SIGNIFICANT CHANGE UP (ref 34.5–45)
HGB BLD-MCNC: 13.3 G/DL — SIGNIFICANT CHANGE UP (ref 11.5–15.5)
LYMPHOCYTES # BLD AUTO: 3.1 K/UL — SIGNIFICANT CHANGE UP (ref 1–3.3)
LYMPHOCYTES # BLD AUTO: 31.1 % — SIGNIFICANT CHANGE UP (ref 13–44)
MAGNESIUM SERPL-MCNC: 2.3 MG/DL — SIGNIFICANT CHANGE UP (ref 1.6–2.6)
MCHC RBC-ENTMCNC: 31.9 PG — SIGNIFICANT CHANGE UP (ref 27–34)
MCHC RBC-ENTMCNC: 34.5 GM/DL — SIGNIFICANT CHANGE UP (ref 32–36)
MCV RBC AUTO: 92.4 FL — SIGNIFICANT CHANGE UP (ref 80–100)
MONOCYTES # BLD AUTO: 0.6 K/UL — SIGNIFICANT CHANGE UP (ref 0–0.9)
MONOCYTES NFR BLD AUTO: 5.5 % — SIGNIFICANT CHANGE UP (ref 2–14)
NEUTROPHILS # BLD AUTO: 6.2 K/UL — SIGNIFICANT CHANGE UP (ref 1.8–7.4)
NEUTROPHILS NFR BLD AUTO: 61.9 % — SIGNIFICANT CHANGE UP (ref 43–77)
PHOSPHATE SERPL-MCNC: 3.5 MG/DL — SIGNIFICANT CHANGE UP (ref 2.5–4.5)
PLATELET # BLD AUTO: 283 K/UL — SIGNIFICANT CHANGE UP (ref 150–400)
POTASSIUM SERPL-MCNC: 3.8 MMOL/L — SIGNIFICANT CHANGE UP (ref 3.5–5.3)
POTASSIUM SERPL-SCNC: 3.8 MMOL/L — SIGNIFICANT CHANGE UP (ref 3.5–5.3)
RBC # BLD: 4.18 M/UL — SIGNIFICANT CHANGE UP (ref 3.8–5.2)
RBC # FLD: 12 % — SIGNIFICANT CHANGE UP (ref 10.3–14.5)
SODIUM SERPL-SCNC: 140 MMOL/L — SIGNIFICANT CHANGE UP (ref 135–145)
WBC # BLD: 10.1 K/UL — SIGNIFICANT CHANGE UP (ref 3.8–10.5)
WBC # FLD AUTO: 10.1 K/UL — SIGNIFICANT CHANGE UP (ref 3.8–10.5)

## 2017-06-19 PROCEDURE — 93005 ELECTROCARDIOGRAM TRACING: CPT

## 2017-06-19 PROCEDURE — 81001 URINALYSIS AUTO W/SCOPE: CPT

## 2017-06-19 PROCEDURE — 72040 X-RAY EXAM NECK SPINE 2-3 VW: CPT | Mod: 26,59

## 2017-06-19 PROCEDURE — 84702 CHORIONIC GONADOTROPIN TEST: CPT

## 2017-06-19 PROCEDURE — 82330 ASSAY OF CALCIUM: CPT

## 2017-06-19 PROCEDURE — 85027 COMPLETE CBC AUTOMATED: CPT

## 2017-06-19 PROCEDURE — 80053 COMPREHEN METABOLIC PANEL: CPT

## 2017-06-19 PROCEDURE — 82306 VITAMIN D 25 HYDROXY: CPT

## 2017-06-19 PROCEDURE — 99222 1ST HOSP IP/OBS MODERATE 55: CPT

## 2017-06-19 PROCEDURE — 83735 ASSAY OF MAGNESIUM: CPT

## 2017-06-19 PROCEDURE — 72081 X-RAY EXAM ENTIRE SPI 1 VW: CPT

## 2017-06-19 PROCEDURE — 87040 BLOOD CULTURE FOR BACTERIA: CPT

## 2017-06-19 PROCEDURE — 87086 URINE CULTURE/COLONY COUNT: CPT

## 2017-06-19 PROCEDURE — 84100 ASSAY OF PHOSPHORUS: CPT

## 2017-06-19 PROCEDURE — 84443 ASSAY THYROID STIM HORMONE: CPT

## 2017-06-19 PROCEDURE — 83036 HEMOGLOBIN GLYCOSYLATED A1C: CPT

## 2017-06-19 PROCEDURE — 72040 X-RAY EXAM NECK SPINE 2-3 VW: CPT

## 2017-06-19 PROCEDURE — 86140 C-REACTIVE PROTEIN: CPT

## 2017-06-19 PROCEDURE — 99285 EMERGENCY DEPT VISIT HI MDM: CPT | Mod: 25

## 2017-06-19 PROCEDURE — 80048 BASIC METABOLIC PNL TOTAL CA: CPT

## 2017-06-19 PROCEDURE — 72082 X-RAY EXAM ENTIRE SPI 2/3 VW: CPT | Mod: 26

## 2017-06-19 PROCEDURE — 70552 MRI BRAIN STEM W/DYE: CPT

## 2017-06-19 PROCEDURE — 85652 RBC SED RATE AUTOMATED: CPT

## 2017-06-19 PROCEDURE — 86618 LYME DISEASE ANTIBODY: CPT

## 2017-06-19 PROCEDURE — 83605 ASSAY OF LACTIC ACID: CPT

## 2017-06-19 PROCEDURE — 82607 VITAMIN B-12: CPT

## 2017-06-19 RX ORDER — ACETAMINOPHEN 500 MG
2 TABLET ORAL
Qty: 0 | Refills: 0 | DISCHARGE
Start: 2017-06-19

## 2017-06-19 RX ADMIN — PANTOPRAZOLE SODIUM 40 MILLIGRAM(S): 20 TABLET, DELAYED RELEASE ORAL at 12:15

## 2017-06-19 RX ADMIN — LISINOPRIL 10 MILLIGRAM(S): 2.5 TABLET ORAL at 06:15

## 2017-06-19 RX ADMIN — Medication 100 MILLIGRAM(S): at 12:16

## 2017-06-19 RX ADMIN — ENOXAPARIN SODIUM 40 MILLIGRAM(S): 100 INJECTION SUBCUTANEOUS at 12:16

## 2017-06-19 NOTE — DISCHARGE NOTE ADULT - CARE PROVIDER_API CALL
Arben Jimenez (SHAAN), Medicine  [address] [place] [address]  Phone: [phone]  Fax: [phone]    Angelina Boudreaux), Psychiatry; Psychosomatic Medicine  [address] [place] [id number]  Phone: [phone]  Fax: [phone]

## 2017-06-19 NOTE — DISCHARGE NOTE ADULT - PATIENT PORTAL LINK FT
“You can access the Follow[facility] Patient Portal, offered by [facility], by registering with the following website: [email or web address]”

## 2017-06-19 NOTE — DISCHARGE NOTE ADULT - CARE PLAN
Principal Discharge DX:	Lyme disease  Goal:	unfounded  Instructions for follow-up, activity and diet:	TENA negative.  Secondary Diagnosis:	Leukocytosis  Goal:	resolved  Instructions for follow-up, activity and diet:	2/2 steroid  Secondary Diagnosis:	Multiple sclerosis exacerbation  Goal:	unfounded  Instructions for follow-up, activity and diet:	MRI of brain with and without contrast normal.  Secondary Diagnosis:	Generalized pain  Goal:	resolution  Instructions for follow-up, activity and diet:	Tylenol for discomfort.  Followup with your PCP  Secondary Diagnosis:	Hypertension  Goal:	<140/90  Instructions for follow-up, activity and diet:	Continue with Lisinopril and DASH diet.  Followup with your PCP  Secondary Diagnosis:	Adjustment disorder with anxious mood  Goal:	supportive management  Instructions for follow-up, activity and diet:	It is recommended that patient attend psychotherapy.  Pt refuses  Secondary Diagnosis:	Cerebral venous sinus thrombosis  Goal:	resolved  Instructions for follow-up, activity and diet:	Patient has history of this.

## 2017-06-19 NOTE — BEHAVIORAL HEALTH ASSESSMENT NOTE - SUMMARY
Patient is a 31 year-old female from home with PMH of HTN, sigmoid sinus thrombosis who presents with headache, body aches, blurry vision for 2 months. Symptoms do not always occur together and are transient. She was living in [place] until recently when she moved to [place] to establish medical care. She reports that doctors could not find a cause for her symptoms in [place] and she has failed treatment with gabapentin and other medications that she does not know the names of.  She reports that she has tingling in scalp, intermittent tingling in hands, occasional chest pain, back pain, and bilateral lower extremity pain that is 8/10 in intensity. She reports that she has self diagnosed herself with multiple sclerosis. She was experiencing too much discomfort so she came to the ED instead. She also had lab results with her that showed a positive Lyme titer from 6/13/17. Additionally she reports that she is ERIN positive  Pt with somatic complains. pt is resistant to talk about how she feels emotionally. pt keeps saying she is not crazy  and this is not psychiatric. unclear if there was a stressor that triggered currents xs. BF is at her side showing labs results from a walk in clinic with some abnormalities.   pt is anxious and emotional.   recommendations:  1) rule out medical causes  2)pt would benefit from psychotherapy. although pt is resistant to it.  if no medical causes noted, likely somatization disorder.

## 2017-06-19 NOTE — DISCHARGE NOTE ADULT - SECONDARY DIAGNOSIS.
Leukocytosis Multiple sclerosis exacerbation Generalized pain Hypertension Adjustment disorder with anxious mood Cerebral venous sinus thrombosis

## 2017-06-19 NOTE — BEHAVIORAL HEALTH ASSESSMENT NOTE - NSBHCHARTREVIEWIMAGING_PSY_A_CORE FT
EXAM:  BRAIN MRI WITH CONTRAST                            PROCEDURE DATE:  06/16/2017        INTERPRETATION:  MRI brain with and without contrast    History transient paresthesias and extremity weakness and blurred vision    Contrast Gadavist 7 cc;0.5 cc discarded    There is no mass effect, cortical edema or hydrocephalus. Cortical volume   and white matter signal are preserved. There is no evidence of acute   infarct or previous parenchymal hemorrhage. The orbital and sellar   contents and cerebellar tonsils are within normal limits. There is normal   physiologic enhancement.    IMPRESSION:    Normal brain

## 2017-06-19 NOTE — DISCHARGE NOTE ADULT - HOSPITAL COURSE
Patient is a 31 year-old female from home with PMH of HTN, sigmoid sinus thrombosis who presented with headache, body aches, blurry vision for 2 months. Symptoms do not always occur together and are transient. She was living in [place] until recently when she moved to [place] to establish medical care. She reported that doctors could not find a cause for her symptoms in [place], and she has failed treatment with gabapentin and other medications that she did not recall. She also reported that she has tingling in her scalp, intermittent tingling in hands, occasional chest pain, back pain, and bilateral lower extremity pain that is 8/10 in intensity. She reported that she has self diagnosed herself with multiple sclerosis, and was due for an MRI today (referred by her new neurologist, Dr. Andrade). She was experiencing too much discomfort so she came to the ED instead. She also had lab results with her that showed a positive Lyme titer from 6/13/17. Additionally she reported that she is ERIN positive.  She did not think she has ever been treated with steroids for MS flare. She had a sigmoid sinus thrombosis when she was pregnant 5 years ago, which was treated by lovenox/heparin while in hospital and coumadin after. She is not currently on blood thinners and does not know why she had the clot but reports she was told it is genetic. She also reported that her symptoms are not associated with menstruation. She is a former smoker. She drinks alcohol on special occasions and has never used illicit drugs.     Patient was admitted to internal medicine.    Lyme disease   solu-medrol 1 gram iv daily for 4 days  Ceftriaxone/steroids  Dr. Abarca, ID, evaluated  TENA negative.  No further workup for Lyme disease indicated  Dr. Abarca, discontinued the antiobiotics/steroids    Leukocytosis  2/2 to steroids.  UA/C&S and BC X 2 negative, afebrile  Steroids discontinued and now within normal range    Multiple Sclerosis?, Neurological symptoms/pt with paresthesias. extremity weakness, blurry vision  MRI of the brain with and without contrast normal.    Generalized pain.   X-rays of the cervical, thoracolumbar spines were negative.      Hypertension.  c/o home lisinopril 10mg daily with parameters.   blood pressure monitored    HX Cerebral venous sinus thrombosis  has history of sigmoid sinus thrombosis  no evidence of VTE at this time.     r/o psychosomatic illness  pt fixated on having neurological illness, in spite of normal testing results from several different specialties  Dr. Boudreaux, psychiatrist, evaluated patient and diagnosed her with adjustment disorder with anxious mood.  She stated that there were no psychiatric contraindications to discharge.  She recommended outpatient psychotherapy to benefit patient.  Patient refused.     prophylactic measure.    improved VTE risk score 2; lovenox for prophylaxis.       Dr. Smith, attending, cleared patient for discharge.

## 2017-06-19 NOTE — BEHAVIORAL HEALTH ASSESSMENT NOTE - NSBHCHARTREVIEWLAB_PSY_A_CORE FT
13.3   10.1  )-----------( 283      ( 19 Jun 2017 08:05 )             38.6     06-19    140  |  108  |  23<H>  ----------------------------<  84  3.8   |  26  |  0.61    Ca    8.6      19 Jun 2017 08:05  Phos  3.5     06-19  Mg     2.3     06-19        TSH    B12  Vitamin B12, Serum: 540 pg/mL (06-17 @ 10:11)    Folate

## 2017-06-19 NOTE — DISCHARGE NOTE ADULT - MEDICATION SUMMARY - MEDICATIONS TO STOP TAKING
I will STOP taking the medications listed below when I get home from the hospital:    Neurontin  --  by mouth    famotidine-ibuprofen  --  by mouth

## 2017-06-19 NOTE — DISCHARGE NOTE ADULT - PLAN OF CARE
unfounded TENA negative. resolved 2/2 steroid MRI of brain with and without contrast normal. resolution Tylenol for discomfort.  Followup with your PCP <140/90 Continue with Lisinopril and DASH diet.  Followup with your PCP supportive management It is recommended that patient attend psychotherapy.  Pt refuses Patient has history of this.

## 2017-06-19 NOTE — DISCHARGE NOTE ADULT - MEDICATION SUMMARY - MEDICATIONS TO TAKE
I will START or STAY ON the medications listed below when I get home from the hospital:    acetaminophen 325 mg oral tablet  -- 2 tab(s) by mouth every 6 hours, As needed, Mild Pain (1 - 3)  -- Indication: For discomfort    lisinopril 10 mg oral tablet  -- 1 tab(s) by mouth once a day  -- Indication: For HTN

## 2017-06-19 NOTE — CONSULT NOTE ADULT - SUBJECTIVE AND OBJECTIVE BOX
CHIEF COMPLAINT: Patient is a 31y old  Female who presents with a chief complaint of tingling on scalp, bilateral hands; diffuse pain (2017 13:05)      HPI:  Patient is a 31 year-old female from home with PMH of HTN, sigmoid sinus thrombosis who presents with headache, body aches, blurry vision for 2 months. Symptoms do not always occur together and are transient. She was living in [place] until recently when she moved to [place] to establish medical care. She reports that doctors could not find a cause for her symptoms in [place] and she has failed treatment with gabapentin and other medications that she does not know the names of. Patient is a poor historian and is emotionally distraught throughout interview. She reports that she has tingling in scalp, intermittent tingling in hands, occasional chest pain, back pain, and bilateral lower extremity pain that is 8/10 in intensity. She reports that she has self diagnosed herself with multiple sclerosis, and was due for an MRI today (referred by her new neurologist, Dr. Andrade). She was experiencing too much discomfort so she came to the ED instead. She also had lab results with her that showed a positive Lyme titer from 17. Additionally she reports that she is ERIN positive.  She does not think she has ever been treated with steroids for MS flare. She had a sigmoid sinus thrombosis when she was pregnant 5 years ago which was treated by lovenox/heparin while in hospital and coumadin after. She is not currently on blood thinners and does not know why she had the clot but reports she was told it is genetic. She is currently menstruating and symptoms have not been associated with menstruation. She is a former smoker. She drinks alcohol on special occasions and has never used illicit drugs. (2017 13:05)   Patient seen and examined.     PAST MEDICAL & SURGICAL HISTORY:  Hypertension  Lyme disease  Multiple sclerosis  History of tubal ligation  H/O:       Allergies    No Known Allergies    Intolerances        MEDICATIONS  (STANDING):  insulin lispro (HumaLOG) corrective regimen sliding scale  SubCutaneous three times a day before meals  dextrose 5%. 1000milliLiter(s) IV Continuous <Continuous>  dextrose 50% Injectable 12.5Gram(s) IV Push once  dextrose 50% Injectable 25Gram(s) IV Push once  dextrose 50% Injectable 25Gram(s) IV Push once  pantoprazole  Injectable 40milliGRAM(s) IV Push daily  lisinopril 10milliGRAM(s) Oral daily  enoxaparin Injectable 40milliGRAM(s) SubCutaneous daily  senna 2Tablet(s) Oral at bedtime  docusate sodium 100milliGRAM(s) Oral daily      MEDICATIONS  (PRN):  dextrose Gel 1Dose(s) Oral once PRN Blood Glucose LESS THAN 70 milliGRAM(s)/deciLiter  glucagon  Injectable 1milliGRAM(s) IntraMuscular once PRN Glucose <70 milliGRAM(s)/deciLiter  morphine  - Injectable 2milliGRAM(s) IV Push every 6 hours PRN Severe Pain (7 - 10)  oxyCODONE  5 mG/acetaminophen 325 mG 1Tablet(s) Oral every 4 hours PRN Moderate Pain (4 - 6)  benzocaine 15 mG/menthol 3.6 mG Lozenge 1Lozenge Oral five times a day PRN Sore Throat  acetaminophen   Tablet. 650milliGRAM(s) Oral every 6 hours PRN Mild Pain (1 - 3)   Medications up to date at time of exam.    FAMILY HISTORY:  No pertinent family history in first degree relatives      SOCIAL HISTORY  Smoking History: [   ] smoking/smoke exposure, [ x  ] former smoker, [  ] denies smoking  Living Condition: [   ] apartment, [   ] private house  Work History: unemployed  Travel History: denies recent travel  Illicit Substance Use: denies  Alcohol Use: denies    REVIEW OF SYSTEMS:    CONSTITUTIONAL:  denies fevers, chills, sweats, weight loss    HEENT:  +diplopia +blurred vision, sore throat or runny nose, + HA    CARDIOVASCULAR:  denies pressure, squeezing, tightness, or heaviness about the chest; no palpitations.    RESPIRATORY:  denies SOB, cough, +TORREZ, wheezing.    GASTROINTESTINAL:  denies abdominal pain, nausea, vomiting or diarrhea.    GENITOURINARY: denies dysuria, frequency or urgency.    NEUROLOGIC:  denies numbness, + tingling, seizures or weakness.    PSYCHIATRIC:  denies disorder of thought or mood.    MSK: denies swelling, redness      PHYSICAL EXAMINATION:    GENERAL: The patient is a well-developed, well-nourished, in no apparent distress.     Vital Signs Last 24 Hrs  T(C): 37, Max: 37 (06-18 @ 14:43)  T(F): 98.6, Max: 98.6 (-18 @ 14:43)  HR: 61 (61 - 72)  BP: 121/74 (115/74 - 121/74)  BP(mean): --  RR: 16 (16 - 16)  SpO2: 100% (95% - 100%)   (if applicable)    Chest Tube (if applicable)    HEENT: Head is normocephalic and atraumatic. .    NECK: Supple, no palpable adenopathy.    LUNGS: Clear to auscultation, no wheezing, rales, or rhonchi.    HEART: Regular rate and rhythm without murmur.    ABDOMEN: Soft, nontender, and nondistended.  No hepatosplenomegaly is noted.    EXTREMITIES: Without any cyanosis, clubbing, rash, lesions or edema.    NEUROLOGIC: Awake, alert.    SKIN: Warm, dry, good turgor.      LABS:                        13.3   10.1  )-----------( 283      ( 2017 08:05 )             38.6     -    140  |  108  |  23<H>  ----------------------------<  84  3.8   |  26  |  0.61    Ca    8.6      2017 08:05  Phos  3.5     -  Mg     2.3               MICROBIOLOGY: (if applicable)    RADIOLOGY & ADDITIONAL STUDIES:  EKG:   CXR:  ECHO:    IMPRESSION: 31y Female PAST MEDICAL & SURGICAL HISTORY:  Hypertension  Lyme disease  Multiple sclerosis  History of tubal ligation  H/O:        p/w headache, body aches, blurry vision for 2 months, due to latent lyme? autoimmune? SOB due to acute pain.     RECOMMENDATIONS:     - lyme work up negative    - leukocytosis 2/2 pulse steroid   - abx d/c as per ID   - pt still c/o symptoms, psych, neuro work up   - DVT and GI prophylaxis. CHIEF COMPLAINT: Patient is a 31y old  Female who presents with a chief complaint of tingling on scalp, bilateral hands; diffuse pain (2017 13:05)      HPI:  Patient is a 31 year-old female from home with PMH of HTN, sigmoid sinus thrombosis who presents with headache, body aches, blurry vision for 2 months. Symptoms do not always occur together and are transient. She was living in [place] until recently when she moved to [place] to establish medical care. She reports that doctors could not find a cause for her symptoms in [place] and she has failed treatment with gabapentin and other medications that she does not know the names of. Patient is a poor historian and is emotionally distraught throughout interview. She reports that she has tingling in scalp, intermittent tingling in hands, occasional chest pain, back pain, and bilateral lower extremity pain that is 8/10 in intensity. She reports that she has self diagnosed herself with multiple sclerosis, and was due for an MRI today (referred by her new neurologist, Dr. Andrade). She was experiencing too much discomfort so she came to the ED instead. She also had lab results with her that showed a positive Lyme titer from 17. Additionally she reports that she is ERIN positive.  She does not think she has ever been treated with steroids for MS flare. She had a sigmoid sinus thrombosis when she was pregnant 5 years ago which was treated by lovenox/heparin while in hospital and coumadin after. She is not currently on blood thinners and does not know why she had the clot but reports she was told it is genetic. She is currently menstruating and symptoms have not been associated with menstruation. She is a former smoker. She drinks alcohol on special occasions and has never used illicit drugs. (2017 13:05)   Patient seen and examined.     PAST MEDICAL & SURGICAL HISTORY:  Hypertension  Lyme disease  Multiple sclerosis  History of tubal ligation  H/O:       Allergies    No Known Allergies    Intolerances        MEDICATIONS  (STANDING):  insulin lispro (HumaLOG) corrective regimen sliding scale  SubCutaneous three times a day before meals  dextrose 5%. 1000milliLiter(s) IV Continuous <Continuous>  dextrose 50% Injectable 12.5Gram(s) IV Push once  dextrose 50% Injectable 25Gram(s) IV Push once  dextrose 50% Injectable 25Gram(s) IV Push once  pantoprazole  Injectable 40milliGRAM(s) IV Push daily  lisinopril 10milliGRAM(s) Oral daily  enoxaparin Injectable 40milliGRAM(s) SubCutaneous daily  senna 2Tablet(s) Oral at bedtime  docusate sodium 100milliGRAM(s) Oral daily      MEDICATIONS  (PRN):  dextrose Gel 1Dose(s) Oral once PRN Blood Glucose LESS THAN 70 milliGRAM(s)/deciLiter  glucagon  Injectable 1milliGRAM(s) IntraMuscular once PRN Glucose <70 milliGRAM(s)/deciLiter  morphine  - Injectable 2milliGRAM(s) IV Push every 6 hours PRN Severe Pain (7 - 10)  oxyCODONE  5 mG/acetaminophen 325 mG 1Tablet(s) Oral every 4 hours PRN Moderate Pain (4 - 6)  benzocaine 15 mG/menthol 3.6 mG Lozenge 1Lozenge Oral five times a day PRN Sore Throat  acetaminophen   Tablet. 650milliGRAM(s) Oral every 6 hours PRN Mild Pain (1 - 3)   Medications up to date at time of exam.    FAMILY HISTORY:  No pertinent family history in first degree relatives      SOCIAL HISTORY  Smoking History: [   ] smoking/smoke exposure, [ x  ] former smoker, [  ] denies smoking  Living Condition: [   ] apartment, [   ] private house  Work History: unemployed  Travel History: denies recent travel  Illicit Substance Use: denies  Alcohol Use: denies    REVIEW OF SYSTEMS:    CONSTITUTIONAL:  denies fevers, chills, sweats, weight loss    HEENT:  +diplopia +blurred vision, sore throat or runny nose, + HA    CARDIOVASCULAR:  denies pressure, squeezing, tightness, or heaviness about the chest; no palpitations.    RESPIRATORY:  denies SOB, cough, +TORREZ, wheezing.    GASTROINTESTINAL:  denies abdominal pain, nausea, vomiting or diarrhea.    GENITOURINARY: denies dysuria, frequency or urgency.    NEUROLOGIC:  denies numbness, + tingling, seizures or weakness.    PSYCHIATRIC:  denies disorder of thought or mood.    MSK: denies swelling, redness      PHYSICAL EXAMINATION:    GENERAL: The patient is a well-developed, well-nourished, in no apparent distress.     Vital Signs Last 24 Hrs  T(C): 37, Max: 37 (06-18 @ 14:43)  T(F): 98.6, Max: 98.6 (-18 @ 14:43)  HR: 61 (61 - 72)  BP: 121/74 (115/74 - 121/74)  BP(mean): --  RR: 16 (16 - 16)  SpO2: 100% (95% - 100%)   (if applicable)    Chest Tube (if applicable)    HEENT: Head is normocephalic and atraumatic. .    NECK: Supple, no palpable adenopathy.    LUNGS: Clear to auscultation, no wheezing, rales, or rhonchi.    HEART: Regular rate and rhythm without murmur.    ABDOMEN: Soft, nontender, and nondistended.  No hepatosplenomegaly is noted.    EXTREMITIES: Without any cyanosis, clubbing, rash, lesions or edema.    NEUROLOGIC: Awake, alert.    SKIN: Warm, dry, good turgor.      LABS:                        13.3   10.1  )-----------( 283      ( 2017 08:05 )             38.6     -    140  |  108  |  23<H>  ----------------------------<  84  3.8   |  26  |  0.61    Ca    8.6      2017 08:05  Phos  3.5       Mg     2.3               MICROBIOLOGY: (if applicable)    RADIOLOGY & ADDITIONAL STUDIES:  EKG:   CXR:  ECHO:    IMPRESSION: 31y Female PAST MEDICAL & SURGICAL HISTORY:  Hypertension  Lyme disease  Multiple sclerosis  History of tubal ligation  H/O:        p/w headache, body aches, blurry vision for 2 months, due to latent lyme? autoimmune? SOB due to acute pain.     RECOMMENDATIONS:     - lyme work up negative    - leukocytosis 2/2 pulse steroid   - abx d/c as per ID   - pt still c/o symptoms, psych, neuro work up   - DVT and GI prophylaxis.     Agree with above assessment and plan as transcribed.

## 2017-06-19 NOTE — BEHAVIORAL HEALTH ASSESSMENT NOTE - HPI (INCLUDE ILLNESS QUALITY, SEVERITY, DURATION, TIMING, CONTEXT, MODIFYING FACTORS, ASSOCIATED SIGNS AND SYMPTOMS)
Pt reports that for the last 2 months and a half she feels sick. she has been losing weight, her hair, couldn't chew. has tingling in her head that goes down to her arms and legs. her bones hurt. has burning sensation in legs. has insomnia due to burning sensation. pt googles things and thought she had MS or Lyme disease. good appetite. anxious. denies depressive sxs. wants to feel better. describes self as happy and active. pain when walking. pt was in [place] and went to the ED for work up and work up was negative. pt wants to know what she has.

## 2017-06-21 ENCOUNTER — OUTPATIENT (OUTPATIENT)
Dept: OUTPATIENT SERVICES | Facility: HOSPITAL | Age: 32
LOS: 1 days | End: 2017-06-21
Payer: MEDICAID

## 2017-06-21 ENCOUNTER — APPOINTMENT (OUTPATIENT)
Dept: OBGYN | Facility: CLINIC | Age: 32
End: 2017-06-21

## 2017-06-21 ENCOUNTER — APPOINTMENT (OUTPATIENT)
Dept: ULTRASOUND IMAGING | Facility: HOSPITAL | Age: 32
End: 2017-06-21

## 2017-06-21 ENCOUNTER — RESULT REVIEW (OUTPATIENT)
Age: 32
End: 2017-06-21

## 2017-06-21 VITALS
DIASTOLIC BLOOD PRESSURE: 90 MMHG | SYSTOLIC BLOOD PRESSURE: 130 MMHG | OXYGEN SATURATION: 99 % | RESPIRATION RATE: 18 BRPM | BODY MASS INDEX: 26.46 KG/M2 | HEART RATE: 75 BPM | HEIGHT: 64 IN | WEIGHT: 155 LBS | TEMPERATURE: 98.2 F

## 2017-06-21 DIAGNOSIS — Z01.419 ENCOUNTER FOR GYNECOLOGICAL EXAMINATION (GENERAL) (ROUTINE) W/OUT ABNORMAL FINDINGS: ICD-10-CM

## 2017-06-21 DIAGNOSIS — Z00.00 ENCOUNTER FOR GENERAL ADULT MEDICAL EXAMINATION WITHOUT ABNORMAL FINDINGS: ICD-10-CM

## 2017-06-21 DIAGNOSIS — Z98.891 HISTORY OF UTERINE SCAR FROM PREVIOUS SURGERY: Chronic | ICD-10-CM

## 2017-06-21 DIAGNOSIS — Z98.51 TUBAL LIGATION STATUS: Chronic | ICD-10-CM

## 2017-06-21 DIAGNOSIS — Z87.891 PERSONAL HISTORY OF NICOTINE DEPENDENCE: ICD-10-CM

## 2017-06-21 PROCEDURE — 76830 TRANSVAGINAL US NON-OB: CPT

## 2017-06-21 PROCEDURE — 88175 CYTOPATH C/V AUTO FLUID REDO: CPT

## 2017-06-21 PROCEDURE — G0463: CPT

## 2017-06-21 PROCEDURE — 76830 TRANSVAGINAL US NON-OB: CPT | Mod: 26

## 2017-06-21 PROCEDURE — 76856 US EXAM PELVIC COMPLETE: CPT

## 2017-06-21 PROCEDURE — 87624 HPV HI-RISK TYP POOLED RSLT: CPT

## 2017-06-21 PROCEDURE — 76856 US EXAM PELVIC COMPLETE: CPT | Mod: 26

## 2017-06-22 LAB
CULTURE RESULTS: SIGNIFICANT CHANGE UP
CULTURE RESULTS: SIGNIFICANT CHANGE UP
SPECIMEN SOURCE: SIGNIFICANT CHANGE UP
SPECIMEN SOURCE: SIGNIFICANT CHANGE UP

## 2017-06-23 DIAGNOSIS — R52 PAIN, UNSPECIFIED: ICD-10-CM

## 2017-06-23 DIAGNOSIS — D72.829 ELEVATED WHITE BLOOD CELL COUNT, UNSPECIFIED: ICD-10-CM

## 2017-06-23 DIAGNOSIS — Z87.891 PERSONAL HISTORY OF NICOTINE DEPENDENCE: ICD-10-CM

## 2017-06-23 DIAGNOSIS — G35 MULTIPLE SCLEROSIS: ICD-10-CM

## 2017-06-23 DIAGNOSIS — N93.9 ABNORMAL UTERINE AND VAGINAL BLEEDING, UNSPECIFIED: ICD-10-CM

## 2017-06-23 DIAGNOSIS — Z01.419 ENCOUNTER FOR GYNECOLOGICAL EXAMINATION (GENERAL) (ROUTINE) WITHOUT ABNORMAL FINDINGS: ICD-10-CM

## 2017-06-23 DIAGNOSIS — R10.2 PELVIC AND PERINEAL PAIN: ICD-10-CM

## 2017-06-23 DIAGNOSIS — I10 ESSENTIAL (PRIMARY) HYPERTENSION: ICD-10-CM

## 2017-06-23 DIAGNOSIS — F43.22 ADJUSTMENT DISORDER WITH ANXIETY: ICD-10-CM

## 2017-06-30 ENCOUNTER — APPOINTMENT (OUTPATIENT)
Dept: CARDIOLOGY | Facility: CLINIC | Age: 32
End: 2017-06-30

## 2017-06-30 ENCOUNTER — NON-APPOINTMENT (OUTPATIENT)
Age: 32
End: 2017-06-30

## 2017-06-30 VITALS
BODY MASS INDEX: 26.29 KG/M2 | WEIGHT: 154 LBS | SYSTOLIC BLOOD PRESSURE: 122 MMHG | HEART RATE: 90 BPM | OXYGEN SATURATION: 98 % | HEIGHT: 64 IN | DIASTOLIC BLOOD PRESSURE: 80 MMHG

## 2017-06-30 DIAGNOSIS — Z87.898 PERSONAL HISTORY OF OTHER SPECIFIED CONDITIONS: ICD-10-CM

## 2017-06-30 DIAGNOSIS — Z82.49 FAMILY HISTORY OF ISCHEMIC HEART DISEASE AND OTHER DISEASES OF THE CIRCULATORY SYSTEM: ICD-10-CM

## 2017-06-30 DIAGNOSIS — Z83.3 FAMILY HISTORY OF DIABETES MELLITUS: ICD-10-CM

## 2017-07-05 ENCOUNTER — APPOINTMENT (OUTPATIENT)
Dept: OBGYN | Facility: CLINIC | Age: 32
End: 2017-07-05

## 2017-07-05 ENCOUNTER — OUTPATIENT (OUTPATIENT)
Dept: OUTPATIENT SERVICES | Facility: HOSPITAL | Age: 32
LOS: 1 days | End: 2017-07-05
Payer: MEDICAID

## 2017-07-05 VITALS
HEIGHT: 64 IN | HEART RATE: 80 BPM | WEIGHT: 154 LBS | BODY MASS INDEX: 26.29 KG/M2 | SYSTOLIC BLOOD PRESSURE: 118 MMHG | DIASTOLIC BLOOD PRESSURE: 83 MMHG | OXYGEN SATURATION: 99 % | RESPIRATION RATE: 16 BRPM

## 2017-07-05 DIAGNOSIS — Z98.891 HISTORY OF UTERINE SCAR FROM PREVIOUS SURGERY: Chronic | ICD-10-CM

## 2017-07-05 DIAGNOSIS — Z98.51 TUBAL LIGATION STATUS: Chronic | ICD-10-CM

## 2017-07-05 DIAGNOSIS — Z00.00 ENCOUNTER FOR GENERAL ADULT MEDICAL EXAMINATION WITHOUT ABNORMAL FINDINGS: ICD-10-CM

## 2017-07-05 PROCEDURE — G0463: CPT

## 2017-07-06 ENCOUNTER — EMERGENCY (EMERGENCY)
Facility: HOSPITAL | Age: 32
LOS: 1 days | Discharge: HOME | End: 2017-07-06
Payer: MEDICAID

## 2017-07-06 VITALS
WEIGHT: 154.32 LBS | TEMPERATURE: 99 F | RESPIRATION RATE: 16 BRPM | DIASTOLIC BLOOD PRESSURE: 78 MMHG | HEART RATE: 100 BPM | HEIGHT: 55 IN | OXYGEN SATURATION: 100 % | SYSTOLIC BLOOD PRESSURE: 121 MMHG

## 2017-07-06 DIAGNOSIS — N76.1 SUBACUTE AND CHRONIC VAGINITIS: ICD-10-CM

## 2017-07-06 DIAGNOSIS — R51 HEADACHE: ICD-10-CM

## 2017-07-06 DIAGNOSIS — Z98.891 HISTORY OF UTERINE SCAR FROM PREVIOUS SURGERY: Chronic | ICD-10-CM

## 2017-07-06 DIAGNOSIS — R10.2 PELVIC AND PERINEAL PAIN: ICD-10-CM

## 2017-07-06 DIAGNOSIS — Z98.51 TUBAL LIGATION STATUS: Chronic | ICD-10-CM

## 2017-07-06 PROCEDURE — 99282 EMERGENCY DEPT VISIT SF MDM: CPT

## 2017-07-06 NOTE — ED ADULT TRIAGE NOTE - CHIEF COMPLAINT QUOTE
See downtime interruption notes - patient sent for LP See downtime interruption notes - patient sent for LP c/o headache

## 2017-08-11 ENCOUNTER — APPOINTMENT (OUTPATIENT)
Dept: CARDIOLOGY | Facility: CLINIC | Age: 32
End: 2017-08-11
Payer: MEDICAID

## 2017-08-11 VITALS
HEIGHT: 64 IN | HEART RATE: 77 BPM | DIASTOLIC BLOOD PRESSURE: 78 MMHG | BODY MASS INDEX: 26.46 KG/M2 | SYSTOLIC BLOOD PRESSURE: 122 MMHG | WEIGHT: 155 LBS

## 2017-08-11 PROCEDURE — 99213 OFFICE O/P EST LOW 20 MIN: CPT

## 2019-04-16 ENCOUNTER — APPOINTMENT (OUTPATIENT)
Dept: CARDIOLOGY | Facility: CLINIC | Age: 34
End: 2019-04-16
Payer: MEDICAID

## 2019-04-16 ENCOUNTER — NON-APPOINTMENT (OUTPATIENT)
Age: 34
End: 2019-04-16

## 2019-04-16 VITALS
DIASTOLIC BLOOD PRESSURE: 84 MMHG | HEIGHT: 64 IN | BODY MASS INDEX: 28 KG/M2 | SYSTOLIC BLOOD PRESSURE: 128 MMHG | HEART RATE: 74 BPM | OXYGEN SATURATION: 99 % | WEIGHT: 164 LBS

## 2019-04-16 DIAGNOSIS — Z87.42 PERSONAL HISTORY OF OTHER DISEASES OF THE FEMALE GENITAL TRACT: ICD-10-CM

## 2019-04-16 DIAGNOSIS — Z86.718 PERSONAL HISTORY OF OTHER VENOUS THROMBOSIS AND EMBOLISM: ICD-10-CM

## 2019-04-16 DIAGNOSIS — E78.1 PURE HYPERGLYCERIDEMIA: ICD-10-CM

## 2019-04-16 DIAGNOSIS — R10.2 PELVIC AND PERINEAL PAIN: ICD-10-CM

## 2019-04-16 DIAGNOSIS — N76.1 SUBACUTE AND CHRONIC VAGINITIS: ICD-10-CM

## 2019-04-16 DIAGNOSIS — E03.9 HYPOTHYROIDISM, UNSPECIFIED: ICD-10-CM

## 2019-04-16 DIAGNOSIS — R07.89 OTHER CHEST PAIN: ICD-10-CM

## 2019-04-16 DIAGNOSIS — R42 DIZZINESS AND GIDDINESS: ICD-10-CM

## 2019-04-16 DIAGNOSIS — G89.29 PELVIC AND PERINEAL PAIN: ICD-10-CM

## 2019-04-16 PROCEDURE — 93306 TTE W/DOPPLER COMPLETE: CPT

## 2019-04-16 PROCEDURE — 99214 OFFICE O/P EST MOD 30 MIN: CPT

## 2019-04-16 PROCEDURE — 93000 ELECTROCARDIOGRAM COMPLETE: CPT

## 2019-04-16 RX ORDER — LISINOPRIL 10 MG/1
10 TABLET ORAL DAILY
Refills: 0 | Status: ACTIVE | COMMUNITY

## 2019-04-16 RX ORDER — LEVOTHYROXINE SODIUM 0.05 MG/1
50 TABLET ORAL DAILY
Refills: 0 | Status: ACTIVE | COMMUNITY

## 2019-04-16 RX ORDER — METRONIDAZOLE 7.5 MG/G
0.75 GEL VAGINAL
Qty: 1 | Refills: 1 | Status: DISCONTINUED | COMMUNITY
Start: 2017-07-05 | End: 2019-04-16

## 2019-04-16 NOTE — DISCUSSION/SUMMARY
[FreeTextEntry1] : In a summary Ava Coles is a young female with hypertension, controlled. Continue current medications and 2 gm sodium diet. Palpitations, going to PCP. Get all the blood works.Holter monitor to rule out arrhythmias. Chest pains and dizziness, chronic shortness of breath, echo done showed normal LV systolic function. Will do stress test to evaluate symptoms when she returns. Mean while any worsening symptoms call 911 and go to nearest ER. Avoid spicy foods. Hypertriglyceridemia, avoid fried and fatty foods. Healthy lifestyle.

## 2019-04-16 NOTE — REVIEW OF SYSTEMS
[Feeling Fatigued] : feeling fatigued [Joint Pain] : joint pain [Dizziness] : dizziness [Numbness (Hypesthesia)] : numbness [Negative] : Endocrine [Fever] : no fever [Headache] : no headache [Chills] : no chills [Blurry Vision] : no blurred vision [Seeing Double (Diplopia)] : no diplopia [Eye Pain] : no eye pain [Eyeglasses] : not currently wearing eyeglasses [Earache] : no earache [Discharge From The Ears] : no discharge from the ears [Loss Of Hearing] : no hearing loss [Mouth Sores] : no mouth sores [Sore Throat] : no sore throat [Sinus Pressure] : no sinus pressure [Shortness Of Breath] : no shortness of breath [Chest Pain] : no chest pain [Lower Ext Edema] : no extremity edema [Palpitations] : no palpitations [Abdominal Pain] : no abdominal pain [Nausea] : no nausea [Vomiting] : no vomiting [Heartburn] : no heartburn [Change in Appetite] : no change in appetite [Change In The Stool] : no change in stool [Dysphagia] : no dysphagia [Tremor] : no tremor was seen [Convulsions] : no convulsions [Tingling (Paresthesia)] : no tingling [Confusion] : no confusion was observed [Depression] : no depression [Anxiety] : no anxiety [Under Stress] : not under stress [Suicidal] : not suicidal [Easy Bleeding] : no tendency for easy bleeding [Easy Bruising] : no tendency for easy bruising

## 2019-04-16 NOTE — PHYSICAL EXAM
[General Appearance - Well Developed] : well developed [Normal Appearance] : normal appearance [Well Groomed] : well groomed [General Appearance - Well Nourished] : well nourished [No Deformities] : no deformities [General Appearance - In No Acute Distress] : no acute distress [Normal Conjunctiva] : the conjunctiva exhibited no abnormalities [Eyelids - No Xanthelasma] : the eyelids demonstrated no xanthelasmas [Normal Oral Mucosa] : normal oral mucosa [No Oral Pallor] : no oral pallor [No Oral Cyanosis] : no oral cyanosis [Respiration, Rhythm And Depth] : normal respiratory rhythm and effort [Heart Sounds] : normal S1 and S2 [Heart Rate And Rhythm] : heart rate and rhythm were normal [Auscultation Breath Sounds / Voice Sounds] : lungs were clear to auscultation bilaterally [Murmurs] : no murmurs present [Edema] : no peripheral edema present [Arterial Pulses Normal] : the arterial pulses were normal [Abdomen Soft] : soft [Bowel Sounds] : normal bowel sounds [Abdomen Tenderness] : non-tender [Nail Clubbing] : no clubbing of the fingernails [Abnormal Walk] : normal gait [Cyanosis, Localized] : no localized cyanosis [Skin Turgor] : normal skin turgor [] : no rash [Skin Color & Pigmentation] : normal skin color and pigmentation [Impaired Insight] : insight and judgment were intact [Oriented To Time, Place, And Person] : oriented to person, place, and time [FreeTextEntry1] : No JVD

## 2019-04-16 NOTE — HISTORY OF PRESENT ILLNESS
[FreeTextEntry1] : Ava Coles is a 33 year old female wit history of hypertension, hypertriglyceridemia and hypoThyroidism comes for follow up visit. Complaining of dizziness and burning/sharp random onset of chest pains, nonradiating lasts for couple of minutes and relieved by itself of few days duration after she had periods. Also complaining of fatigue. No exertional chest pains. Random onset of brief episodes of palpitations relieved by itself for few days. Denies any stress, anxiety or depression. Recently moved out of shelter. Chronic mild shortness of breaTh on exertion which is relieved wiTh rest in few minutes. Went to hospital for above symptoms in [place], had blood work and CT scan of The head and was told normal. Compliant to medications and diet. Has appointment wiTh new PCP on 5/1/19. Going to [place] on 19 Th as her grand father is sick.

## 2019-04-16 NOTE — REASON FOR VISIT
[Follow-Up - Clinic] : a clinic follow-up of [Hypertension] : hypertension [Chest Pain] : chest pain [Palpitations] : palpitations

## 2019-04-17 ENCOUNTER — APPOINTMENT (OUTPATIENT)
Dept: CARDIOLOGY | Facility: CLINIC | Age: 34
End: 2019-04-17
Payer: MEDICAID

## 2019-04-17 DIAGNOSIS — R06.02 SHORTNESS OF BREATH: ICD-10-CM

## 2019-04-17 PROCEDURE — 93224 XTRNL ECG REC UP TO 48 HRS: CPT

## 2019-04-17 PROCEDURE — 93351 STRESS TTE COMPLETE: CPT

## 2019-04-25 ENCOUNTER — NON-APPOINTMENT (OUTPATIENT)
Age: 34
End: 2019-04-25

## 2019-06-27 ENCOUNTER — NON-APPOINTMENT (OUTPATIENT)
Age: 34
End: 2019-06-27

## 2019-06-27 ENCOUNTER — APPOINTMENT (OUTPATIENT)
Dept: CARDIOLOGY | Facility: CLINIC | Age: 34
End: 2019-06-27
Payer: MEDICAID

## 2019-06-27 VITALS
WEIGHT: 161 LBS | SYSTOLIC BLOOD PRESSURE: 128 MMHG | HEIGHT: 64 IN | DIASTOLIC BLOOD PRESSURE: 86 MMHG | OXYGEN SATURATION: 100 % | HEART RATE: 65 BPM | BODY MASS INDEX: 27.49 KG/M2

## 2019-06-27 PROCEDURE — 99214 OFFICE O/P EST MOD 30 MIN: CPT

## 2019-06-27 PROCEDURE — 93000 ELECTROCARDIOGRAM COMPLETE: CPT

## 2019-06-27 NOTE — REASON FOR VISIT
[Hypertension] : hypertension [Follow-Up - Clinic] : a clinic follow-up of [FreeTextEntry1] : says heart rate low

## 2019-06-27 NOTE — REVIEW OF SYSTEMS
[Feeling Fatigued] : feeling fatigued [Joint Pain] : joint pain [Dizziness] : dizziness [Numbness (Hypesthesia)] : numbness [Negative] : Psychiatric [Fever] : no fever [Headache] : no headache [Chills] : no chills [Blurry Vision] : no blurred vision [Seeing Double (Diplopia)] : no diplopia [Eye Pain] : no eye pain [Eyeglasses] : not currently wearing eyeglasses [Earache] : no earache [Discharge From The Ears] : no discharge from the ears [Mouth Sores] : no mouth sores [Loss Of Hearing] : no hearing loss [Sore Throat] : no sore throat [Sinus Pressure] : no sinus pressure [Shortness Of Breath] : no shortness of breath [Chest Pain] : no chest pain [Lower Ext Edema] : no extremity edema [Palpitations] : no palpitations [Abdominal Pain] : no abdominal pain [Nausea] : no nausea [Vomiting] : no vomiting [Heartburn] : no heartburn [Change in Appetite] : no change in appetite [Change In The Stool] : no change in stool [Dysphagia] : no dysphagia [Tremor] : no tremor was seen [Convulsions] : no convulsions [Tingling (Paresthesia)] : no tingling [Confusion] : no confusion was observed [Depression] : no depression [Anxiety] : no anxiety [Under Stress] : not under stress [Suicidal] : not suicidal [Easy Bleeding] : no tendency for easy bleeding [Easy Bruising] : no tendency for easy bruising

## 2019-06-27 NOTE — DISCUSSION/SUMMARY
[FreeTextEntry1] : In a summary Ava Coles is a young female with hypertension, controlled. Continue current medications. Complaining of low heart rates, burning random chest pains, shortness of breath with minimal activity but walked about 8 minutes on Pro protocol, recurrent visits to ER, explained if any recurrent symptoms go to nearest ER. EP referral for complains of low heart rates. Follow up after EP consult.

## 2019-06-27 NOTE — HISTORY OF PRESENT ILLNESS
[FreeTextEntry1] : Ava Coles is a 33 year old female with history of hypertension and hypothyroidism comes for follow up visit. In April 2019 came with complains of palpitations, shortness of breath and chest pains . Had echo, stress echo and Holter monitor did not show any significant pathology. At that time she said she can not even walk half a block with out getting symptoms. Walked almost 8 minutes on Pro protocol. Now she comes saying getting random onset of burning pains all over chest, right side of neck, face etc. Went to [facility] ER 4 times with the symptoms. Was told to have bradycardia and details not known. Also saying she wakes up with low heart rates. Her and her  insists to repeat tests. I explained them in detail  the test results. Says not going through stress.

## 2019-06-27 NOTE — PHYSICAL EXAM
[General Appearance - Well Developed] : well developed [Well Groomed] : well groomed [General Appearance - Well Nourished] : well nourished [Normal Appearance] : normal appearance [No Deformities] : no deformities [General Appearance - In No Acute Distress] : no acute distress [Normal Conjunctiva] : the conjunctiva exhibited no abnormalities [Eyelids - No Xanthelasma] : the eyelids demonstrated no xanthelasmas [Normal Oral Mucosa] : normal oral mucosa [No Oral Pallor] : no oral pallor [No Oral Cyanosis] : no oral cyanosis [Respiration, Rhythm And Depth] : normal respiratory rhythm and effort [Heart Rate And Rhythm] : heart rate and rhythm were normal [Auscultation Breath Sounds / Voice Sounds] : lungs were clear to auscultation bilaterally [Heart Sounds] : normal S1 and S2 [Murmurs] : no murmurs present [Arterial Pulses Normal] : the arterial pulses were normal [Edema] : no peripheral edema present [Abdomen Soft] : soft [Bowel Sounds] : normal bowel sounds [Abnormal Walk] : normal gait [Abdomen Tenderness] : non-tender [Nail Clubbing] : no clubbing of the fingernails [Cyanosis, Localized] : no localized cyanosis [Skin Color & Pigmentation] : normal skin color and pigmentation [Skin Turgor] : normal skin turgor [] : no rash [Oriented To Time, Place, And Person] : oriented to person, place, and time [Impaired Insight] : insight and judgment were intact [FreeTextEntry1] : No JVD

## 2019-08-09 ENCOUNTER — NON-APPOINTMENT (OUTPATIENT)
Age: 34
End: 2019-08-09

## 2019-08-09 ENCOUNTER — APPOINTMENT (OUTPATIENT)
Dept: ELECTROPHYSIOLOGY | Facility: CLINIC | Age: 34
End: 2019-08-09
Payer: MEDICAID

## 2019-08-09 VITALS
DIASTOLIC BLOOD PRESSURE: 93 MMHG | HEIGHT: 64 IN | WEIGHT: 161 LBS | OXYGEN SATURATION: 98 % | BODY MASS INDEX: 27.49 KG/M2 | HEART RATE: 76 BPM | SYSTOLIC BLOOD PRESSURE: 133 MMHG

## 2019-08-09 DIAGNOSIS — R00.2 PALPITATIONS: ICD-10-CM

## 2019-08-09 DIAGNOSIS — R00.1 BRADYCARDIA, UNSPECIFIED: ICD-10-CM

## 2019-08-09 DIAGNOSIS — I10 ESSENTIAL (PRIMARY) HYPERTENSION: ICD-10-CM

## 2019-08-09 PROCEDURE — 93000 ELECTROCARDIOGRAM COMPLETE: CPT

## 2019-08-09 PROCEDURE — 99203 OFFICE O/P NEW LOW 30 MIN: CPT

## 2019-08-09 NOTE — HISTORY OF PRESENT ILLNESS
[FreeTextEntry1] : Ms. Pb Escalante is a 33 year old lady with a history of non-specific sensations across the chest. Approximately a month ago, she experienced a sensation of electrical wave across the chest that lasted off and on for a few minutes. She went to the ER locally and there were no major findings. There is a mention of bradycardia while in the ER. She was sweaty, felt unwell and felt her heart pounding but at a normal or slightly faster rate. She has not had any syncope.\par \par She had an ETT with echo in April 2019. Her heart rate increased from 76 at baseline to 166 at peak exercise. She has a normal chronotropic response to exercise. The echo was reported as normal.\par The ECG recordings during exercise remain normal with no ST or T wave changes and no arrhythmias. \par \par She lives with her 7 year old daughter and is  from her  who lives with her 15 year sld son in [place].\par \par She is fairly active and has no effort related cardiac symptoms. \par \par She has treated hypothyroidism and was prescribed lisinopril 10mg for hypertension. She hardly takes it as she thinks it drops her pressure too much and she feels dizzy.

## 2019-08-09 NOTE — PHYSICAL EXAM
[General Appearance - Well Developed] : well developed [Normal Appearance] : normal appearance [Well Groomed] : well groomed [No Deformities] : no deformities [General Appearance - Well Nourished] : well nourished [General Appearance - In No Acute Distress] : no acute distress [Normal Conjunctiva] : the conjunctiva exhibited no abnormalities [Normal Oral Mucosa] : normal oral mucosa [Eyelids - No Xanthelasma] : the eyelids demonstrated no xanthelasmas [No Oral Pallor] : no oral pallor [No Oral Cyanosis] : no oral cyanosis [Normal Jugular Venous A Waves Present] : normal jugular venous A waves present [Normal Jugular Venous V Waves Present] : normal jugular venous V waves present [No Jugular Venous Pena A Waves] : no jugular venous pena A waves [Heart Rate And Rhythm] : heart rate and rhythm were normal [Heart Sounds] : normal S1 and S2 [Murmurs] : no murmurs present [Respiration, Rhythm And Depth] : normal respiratory rhythm and effort [Exaggerated Use Of Accessory Muscles For Inspiration] : no accessory muscle use [Auscultation Breath Sounds / Voice Sounds] : lungs were clear to auscultation bilaterally [Abdomen Soft] : soft [Abdomen Tenderness] : non-tender [Abdomen Mass (___ Cm)] : no abdominal mass palpated [Abnormal Walk] : normal gait [Gait - Sufficient For Exercise Testing] : the gait was sufficient for exercise testing [Nail Clubbing] : no clubbing of the fingernails [Cyanosis, Localized] : no localized cyanosis [Petechial Hemorrhages (___cm)] : no petechial hemorrhages [Skin Color & Pigmentation] : normal skin color and pigmentation [] : no rash [Skin Lesions] : no skin lesions [No Venous Stasis] : no venous stasis [No Skin Ulcers] : no skin ulcer [No Xanthoma] : no  xanthoma was observed

## 2019-08-09 NOTE — DISCUSSION/SUMMARY
[FreeTextEntry1] : This 33 year old lady has non specific symptoms with a negative ETT with echocardiogram. Her resting ECG is normal. The reported bradycardia was likely a vagotonic response as she has no other evidence for sinus or AV nisa disease. I suggested that she wear an event monitor but she was not keen (having done this previously). I have taken a reassuring line with her explaining that her conduction system is normal and there is no need for any specific interventions at this time. \par \par She can continue follow up with her cardiologist to whom I will copy this note.

## 2019-08-28 NOTE — PROGRESS NOTE ADULT - PROBLEM SELECTOR PLAN 3
CAD and murmur    ECG reviewed  Rhythm: regular  Rate: normal  Echocardiogram reviewed               ROS comment: Summary   Poor image quality.   Overall left ventricular systolic function appears moderately reduced.   Ejection fraction is visually estimated to be 35-40% with diffuse   hypokinesis. There is mildly increased left ventricular wall thickness.   The right ventricle is not well visualized but ICD wire is present.   Mild tricuspid regurgitation.   Trivial aortic and mitral regurgitation.      Signature      ------------------------------------------------------------------   Electronically signed by Elizabeth Benavidez MD   (Interpreting physician) on 05/24/2019 at 05:22 PM     Neuro/Psych:   (+) CVA:,    (-) seizures and neuromuscular disease           GI/Hepatic/Renal:   (+) GERD:, morbid obesity     (-) PUD, hepatitis and liver disease       Endo/Other:    (+) DiabetesType II DM, , hypothyroidism, hyperthyroidism::., .                 Abdominal:   (+) obese,         Vascular:                                        Anesthesia Plan      MAC     ASA 3       Induction: intravenous. Anesthetic plan and risks discussed with patient and spouse. Plan discussed with CRNA. This pre-anesthesia assessment may be used as a history and physical.    DOS STAFF ADDENDUM:    Pt seen and examined, chart reviewed (including anesthesia, drug and allergy history). No interval changes to history and physical examination. Anesthetic plan, risks, benefits, alternatives, and personnel involved discussed with patient. Patient verbalized an understanding and agrees to proceed.       Amanda Schumacher MD  August 28, 2019  10:03 AM        Amanda Schumacher MD   8/28/2019
R/o, MRI neg
R/o, MRI neg

## 2019-10-15 ENCOUNTER — APPOINTMENT (OUTPATIENT)
Dept: CARDIOLOGY | Facility: CLINIC | Age: 34
End: 2019-10-15

## 2019-11-27 ENCOUNTER — APPOINTMENT (OUTPATIENT)
Dept: ENDOCRINOLOGY | Facility: CLINIC | Age: 34
End: 2019-11-27

## 2020-01-23 NOTE — H&P ADULT - PROBLEM SELECTOR PLAN 4
[facility]  Cardiology Consultation    [facility] Location: [address]    10/27/1936 MRN [id number]   Consulting Date 2020 TENZIN [id number]   Consult SKIN SURGERY  5/11/16    MMS for [facility] to the Right Nasal Supratip-AB   • SKIN SURGERY  08/28/2017    MMS of BCC to right anterior neck w/ SD     Family History   Problem Relation Age of Onset   • Other (Other[other]) Father    • Diabetes Mother    • Cancer General: Comfortable, well-nourished, in no acute distress   HEENT: Atraumatic. Mucous membranes are moist.  Oropharynx is clear. Neck: No JVD. Carotids normal pulses without bruits.    Lungs: Clear to auscultation bilaterally, decreased bases  Cardiac: - has history of sigmoid sinus thrombosis, has not been on anticoagulation for a few years  - will need coagulation profile  - no evidence of VTE at this time - has history of sigmoid sinus thrombosis, has not been on anticoagulation for a few years  - will need coagulation profile; follow up MRI head   - no evidence of VTE at this time

## 2020-02-12 ENCOUNTER — APPOINTMENT (OUTPATIENT)
Dept: ENDOCRINOLOGY | Facility: CLINIC | Age: 35
End: 2020-02-12
Payer: MEDICAID

## 2020-02-12 VITALS
SYSTOLIC BLOOD PRESSURE: 134 MMHG | WEIGHT: 153 LBS | HEART RATE: 75 BPM | BODY MASS INDEX: 28.16 KG/M2 | HEIGHT: 62 IN | OXYGEN SATURATION: 100 % | DIASTOLIC BLOOD PRESSURE: 81 MMHG

## 2020-02-12 DIAGNOSIS — E03.8 OTHER SPECIFIED HYPOTHYROIDISM: ICD-10-CM

## 2020-02-12 DIAGNOSIS — E06.3 OTHER SPECIFIED HYPOTHYROIDISM: ICD-10-CM

## 2020-02-12 PROCEDURE — 99205 OFFICE O/P NEW HI 60 MIN: CPT

## 2020-02-14 LAB
T3FREE SERPL-MCNC: 3.46 PG/ML
T4 FREE SERPL-MCNC: 1.4 NG/DL
THYROGLOB AB SERPL-ACNC: 23 IU/ML
THYROPEROXIDASE AB SERPL IA-ACNC: 39.3 IU/ML
TSH SERPL-ACNC: 3.25 UIU/ML

## 2020-02-14 NOTE — END OF VISIT
[FreeTextEntry3] : All medical record entries made by the Scribe were at my, Dr. Ismael Gutierrez, direction and personally dictated by me on 02/12/2020. I have reviewed the chart and agree that the record accurately reflects my personal performance of the history, physical exam, assessment and plan. I have also personally directed, reviewed and agreed with the chart.  [>50% of Time Spent on Counseling for ____] : Greater than 50% of the encounter time was spent on counseling for [unfilled] [Time Spent: ___ minutes] : I have spent [unfilled] minutes of face to face time with the patient

## 2020-02-14 NOTE — PHYSICAL EXAM
[Alert] : alert [Normal Outer Ear/Nose] : the ears and nose were normal in appearance [Normal Sclera/Conjunctiva] : normal sclera/conjunctiva [Clear to Auscultation] : lungs were clear to auscultation bilaterally [No Respiratory Distress] : no respiratory distress [Regular Rhythm] : with a regular rhythm [Normal Rate] : heart rate was normal  [Normal S1, S2] : normal S1 and S2 [No Edema] : there was no peripheral edema [Normal Bowel Sounds] : normal bowel sounds [Spine Straight] : spine straight [Normal Gait] : normal gait [No Rash] : no rash [Oriented x3] : oriented to person, place, and time [de-identified] : hand tremors

## 2020-02-14 NOTE — ADDENDUM
[FreeTextEntry1] : I, Tami Marinelli, acted solely as a scribe for Dr. Ismael Gutierrez on this date. 02/12/2020.

## 2020-02-14 NOTE — REVIEW OF SYSTEMS
[Nausea] : nausea [Dizziness] : dizziness [Cold Intolerance] : cold intolerant [Negative] : Psychiatric [Heat Intolerance] : heat tolerant [Recent Weight Loss (___ Lbs)] : no recent weight loss

## 2020-02-14 NOTE — HISTORY OF PRESENT ILLNESS
[FreeTextEntry1] : 34 year female pt, with Hx of Hashimoto's thyroiditis (dx in 2017), presents today to establish endocrine care with me.\par Other PMHx: Lupus, HTN, Fibromyalgia, Hypertriglyceridemia, Sleep Apnea\par No head and neck radiation exposure. \par FHx: DM (grandmother)\par No FHx of thyroid disease, and CAD.\par SHx: Non-smoker. No EtOH use. Born in [place]par Sleeps ~7 hrs a day.\par LMP: 1/29/20, 2 pregnancies\par \par 02/12/2020\par Pt was reportedly dx with Hashimoto's thyroiditis/ hypothyroidism in 2017. She was not initiated on any medication at the time. She followed with another endocrinologist who started her on levothyroxine for 2 years. It was discontinued on 12/2019.\par \par Pt presents today, feeling nauseous, with c/o dizziness and cold hands. She reports feeling confused sometimes. Pt was on levothyroxine and self-discontinued on 12/2019 due to palpitations. She reports that her palpitations resolved after discontinuation of levothyroxine. Pt reports sleeping ~ 7hrs a day and does not drink coffee. \par Denies weight changes, heat intolerance. \par \par Current Medications: Plaquenil, Lisinopril\par Levothyroxine 75 mcg (DC/ed 2/2 palpitations)

## 2020-02-14 NOTE — ASSESSMENT
[FreeTextEntry1] : 34 year female with:\par 1. Hx of Hashimoto's thyroiditis (dx in 2017):\par Pt took levothyroxine 75 mcg for 2 years and discontinued it in 12/2019. \par Pt appears slightly anxious but is categorically calm. Review of Hashimoto's thyroiditis explained to pt.\par Ordered labs including TFTs and thyroid US. \par \par Return in: 1 month

## 2020-03-11 ENCOUNTER — NON-APPOINTMENT (OUTPATIENT)
Age: 35
End: 2020-03-11

## 2020-03-25 ENCOUNTER — APPOINTMENT (OUTPATIENT)
Dept: ENDOCRINOLOGY | Facility: CLINIC | Age: 35
End: 2020-03-25

## 2022-04-30 RX ORDER — LISINOPRIL 2.5 MG/1
1 TABLET ORAL
Qty: 0 | Refills: 0 | DISCHARGE

## 2022-04-30 RX ORDER — IBUPROFEN AND FAMOTIDINE 26.6; 8 MG/1; MG/1
0 TABLET, FILM COATED ORAL
Qty: 0 | Refills: 0 | DISCHARGE

## 2022-04-30 RX ORDER — GABAPENTIN 400 MG/1
0 CAPSULE ORAL
Qty: 0 | Refills: 0 | DISCHARGE

## 2023-04-24 NOTE — H&P ADULT - PSY GEN HX ROS MEA POS PC
Ms. Ramon Merlos is a 24 y.o. right handed woman  who is seen today in Hand Surgical Consultation at the request of No primary care provider on file. .    She is seen today regarding an injury occurring on April 8th, 2023. She reports having sustained a laceration to the Radial and Dorsal aspect of the  left Index Finger and the ulnar dorsal aspect of the middle finger when she was bit by a dog at work. At the time of injury, there was substantial bleeding & there was not acute sensory loss. She was seen for Emergency evaluation elsewhere, radiographs were obtained. She was admitted and given IV antibiotics and sent home on oral antibiotics. By report, her skin injury was repaired after the wound was thoroughly cleansed. The nail structures were not involved in the injury and did not require repair at the time. She reports mild pain located in the Dorsal aspect of the Index Finger and Middle Finger, no tenderness of the wrist or elbow. She notes today, mild numbness in the  dorsal aspect of the index finger    . Symptoms are improving over time. I have today reviewed with Ramon Merlos the clinically relevant, past medical history, medications, allergies,  family history, social history, and Review Of Systems & I have documented any details relevant to today's presenting complaints in my history above. Ms. Ramon Merlos self-reported past medical history, medications, allergies,  family history, social history, and Review Of Systems have been scanned into the chart under the \"Media\" tab. Physical Exam:  Ms. Ramon Merlos most recent vitals:  Vitals  Resp: 16  Height: 5' 10\" (177.8 cm)  Weight - Scale: 200 lb (90.7 kg)    She is well nourished, oriented to person, place & time. She demonstrates appropriate mood and affect as well as normal gait and station.     Skin: There is Longitudinal 5cm laceration on the Radial and Dorsal left Index Finger between
anxiety